# Patient Record
Sex: FEMALE | Race: WHITE | NOT HISPANIC OR LATINO | Employment: STUDENT | URBAN - METROPOLITAN AREA
[De-identification: names, ages, dates, MRNs, and addresses within clinical notes are randomized per-mention and may not be internally consistent; named-entity substitution may affect disease eponyms.]

---
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
## 2018-07-21 ENCOUNTER — HEALTH MAINTENANCE LETTER (OUTPATIENT)
Age: 18
End: 2018-07-21

## 2018-12-18 ENCOUNTER — OFFICE VISIT (OUTPATIENT)
Dept: FAMILY MEDICINE | Facility: CLINIC | Age: 18
End: 2018-12-18

## 2018-12-18 VITALS
BODY MASS INDEX: 29.23 KG/M2 | TEMPERATURE: 98.9 F | SYSTOLIC BLOOD PRESSURE: 112 MMHG | DIASTOLIC BLOOD PRESSURE: 68 MMHG | OXYGEN SATURATION: 98 % | WEIGHT: 171.2 LBS | HEIGHT: 64 IN | HEART RATE: 79 BPM

## 2018-12-18 DIAGNOSIS — Z00.00 ROUTINE HISTORY AND PHYSICAL EXAMINATION OF ADULT: Primary | ICD-10-CM

## 2018-12-18 DIAGNOSIS — E66.3 OVERWEIGHT: ICD-10-CM

## 2018-12-18 DIAGNOSIS — E55.9 VITAMIN D DEFICIENCY: ICD-10-CM

## 2018-12-18 PROBLEM — Z71.89 ACP (ADVANCE CARE PLANNING): Status: ACTIVE | Noted: 2018-12-18

## 2018-12-18 PROBLEM — Z76.89 HEALTH CARE HOME: Status: ACTIVE | Noted: 2018-12-18

## 2018-12-18 LAB
ERYTHROCYTE [DISTWIDTH] IN BLOOD BY AUTOMATED COUNT: 12.9 %
HBA1C MFR BLD: 5.3 % (ref 0–5.6)
HCT VFR BLD AUTO: 41.5 % (ref 35–47)
HEMOGLOBIN: 13.6 G/DL (ref 11.7–15.7)
MCH RBC QN AUTO: 27.4 PG (ref 26–33)
MCHC RBC AUTO-ENTMCNC: 32.8 G/DL (ref 31–36)
MCV RBC AUTO: 83.6 FL (ref 78–100)
PLATELET COUNT - QUEST: 277 10^9/L (ref 150–375)
RBC # BLD AUTO: 4.96 10*12/L (ref 3.8–5.2)
WBC # BLD AUTO: 7.2 10*9/L (ref 4–11)

## 2018-12-18 PROCEDURE — 83036 HEMOGLOBIN GLYCOSYLATED A1C: CPT | Performed by: PHYSICIAN ASSISTANT

## 2018-12-18 PROCEDURE — 82306 VITAMIN D 25 HYDROXY: CPT | Mod: 90 | Performed by: PHYSICIAN ASSISTANT

## 2018-12-18 PROCEDURE — 84443 ASSAY THYROID STIM HORMONE: CPT | Mod: 90 | Performed by: PHYSICIAN ASSISTANT

## 2018-12-18 PROCEDURE — 99385 PREV VISIT NEW AGE 18-39: CPT | Performed by: PHYSICIAN ASSISTANT

## 2018-12-18 PROCEDURE — 36415 COLL VENOUS BLD VENIPUNCTURE: CPT | Performed by: PHYSICIAN ASSISTANT

## 2018-12-18 PROCEDURE — 85027 COMPLETE CBC AUTOMATED: CPT | Performed by: PHYSICIAN ASSISTANT

## 2018-12-18 RX ORDER — CHLORAL HYDRATE 500 MG
2 CAPSULE ORAL DAILY
COMMUNITY
End: 2020-01-20

## 2018-12-18 RX ORDER — MULTIPLE VITAMINS W/ MINERALS TAB 9MG-400MCG
1 TAB ORAL DAILY
COMMUNITY
End: 2021-05-20

## 2018-12-18 SDOH — HEALTH STABILITY: MENTAL HEALTH: HOW OFTEN DO YOU HAVE A DRINK CONTAINING ALCOHOL?: NEVER

## 2018-12-18 ASSESSMENT — ANXIETY QUESTIONNAIRES
IF YOU CHECKED OFF ANY PROBLEMS ON THIS QUESTIONNAIRE, HOW DIFFICULT HAVE THESE PROBLEMS MADE IT FOR YOU TO DO YOUR WORK, TAKE CARE OF THINGS AT HOME, OR GET ALONG WITH OTHER PEOPLE: NOT DIFFICULT AT ALL
3. WORRYING TOO MUCH ABOUT DIFFERENT THINGS: NOT AT ALL
1. FEELING NERVOUS, ANXIOUS, OR ON EDGE: NOT AT ALL
5. BEING SO RESTLESS THAT IT IS HARD TO SIT STILL: NOT AT ALL
GAD7 TOTAL SCORE: 0
2. NOT BEING ABLE TO STOP OR CONTROL WORRYING: NOT AT ALL
7. FEELING AFRAID AS IF SOMETHING AWFUL MIGHT HAPPEN: NOT AT ALL
6. BECOMING EASILY ANNOYED OR IRRITABLE: NOT AT ALL

## 2018-12-18 ASSESSMENT — PATIENT HEALTH QUESTIONNAIRE - PHQ9
5. POOR APPETITE OR OVEREATING: NOT AT ALL
SUM OF ALL RESPONSES TO PHQ QUESTIONS 1-9: 0

## 2018-12-18 ASSESSMENT — MIFFLIN-ST. JEOR: SCORE: 1531.56

## 2018-12-18 NOTE — NURSING NOTE
Patient is here for a full physical exam.    Pre-Visit Screening :  Immunizations : up to date  Colon Screening : NA  Mammogram: NA  Asthma Action Test/Plan : NA  PHQ9 :  Done today  GAD7 :  Done today  Patient's  BMI Body mass index is 29.39 kg/m .  Questioned patient about current smoking habits.  Pt. has never smoked.  OK to leave a detailed voice message regarding today's visit Yes, phone # [phone]

## 2018-12-18 NOTE — PROGRESS NOTES
SUBJECTIVE:   CC: Anaya Serna is an 18 year old woman who presents for preventive health visit.     Healthy Habits:    Do you get at least three servings of calcium containing foods daily (dairy, green leafy vegetables, etc.)? yes    Amount of exercise or daily activities, outside of work: yoga 5x a week     Problems taking medications regularly No    Medication side effects: No    Have you had an eye exam in the past two years? yes    Do you see a dentist twice per year? yes    Do you have sleep apnea, excessive snoring or daytime drowsiness?no    Pt is new to our clinic.    I have reviewed the following histories: Past Medical History, Past Surgical History, Social History, Family History, Problem List, Medication List and Allergies      Pt at [facility]    Hx of Vit D def. High school  Not currently taking Vit D    AUDIE:   HodgemanCalderon Bernabe MD  Dg with AUDIE at 11-12.   Seeing therapist once a month    Has never been sexually active, no pap hx.       Today's PHQ-2 Score:   PHQ-2 ( 1999 Pfizer) 12/18/2018   Q1: Little interest or pleasure in doing things 0   Q2: Feeling down, depressed or hopeless 0   PHQ-2 Score 0       Abuse: Current or Past(Physical, Sexual or Emotional)- No  Do you feel safe in your environment? Yes    Social History     Tobacco Use     Smoking status: Never Smoker     Smokeless tobacco: Never Used   Substance Use Topics     Alcohol use: No     Frequency: Never     If you drink alcohol do you typically have >3 drinks per day or >7 drinks per week? No                     Reviewed orders with patient.  Reviewed health maintenance and updated orders accordingly - Yes  Labs reviewed in EPIC  BP Readings from Last 3 Encounters:   12/18/18 112/68   04/17/17 112/83 (52 %/ 97 %)*   10/19/15 125/78     *BP percentiles are based on the August 2017 AAP Clinical Practice Guideline for girls    Wt Readings from Last 3 Encounters:   12/18/18 77.7 kg (171 lb 3.2 oz)   04/16/17 61.2 kg  (135 lb) (64 %)*     * Growth percentiles are based on CDC (Girls, 2-20 Years) data.                  Patient Active Problem List   Diagnosis     Pain in joint, ankle and foot     Hip pain     Abnormal gait     Status post hip surgery     Hip pain, right     Health Care Home     ACP (advance care planning)     Past Surgical History:   Procedure Laterality Date     HIP SURGERY Left 2016    MD in [place]     other surgery      hardware removal of hip 2016       Social History     Tobacco Use     Smoking status: Never Smoker     Smokeless tobacco: Never Used   Substance Use Topics     Alcohol use: No     Frequency: Never     Family History   Problem Relation Age of Onset     Anxiety Disorder Mother      Hypertension Father      Lupus Maternal Grandmother      Chronic Bronchitis Maternal Grandmother         non-smoker     Obstructive Sleep Apnea Maternal Grandmother      Hypertension Maternal Grandfather      Alcoholism Maternal Grandfather      Obstructive Sleep Apnea Maternal Grandfather      Anxiety Disorder Paternal Grandmother      Other - See Comments Paternal Grandmother         insomnia     Prostate Cancer Paternal Grandfather      Melanoma Paternal Grandfather          Current Outpatient Medications   Medication Sig Dispense Refill     fish oil-omega-3 fatty acids 1000 MG capsule Take 2 g by mouth daily       FLUoxetine (PROZAC) 20 MG capsule Take 3 capsules (60 mg) by mouth daily 270 capsule 3     LORAZEPAM PO Take 0.5 mg by mouth every 8 hours as needed for anxiety       multivitamin w/minerals (MULTI-VITAMIN) tablet Take 1 tablet by mouth daily       Probiotic Product (PROBIOTIC-10 PO)        Allergies   Allergen Reactions     Azithromycin Hives     Benadryl [Diphenhydramine]      Anxious, shaky     Latex Hives     No lab results found.     Mammogram not appropriate for this patient based on age.    Pertinent mammograms are reviewed under the imaging tab.  History of abnormal Pap smear: NO - under age 21,  "PAP not appropriate for age     Reviewed and updated as needed this visit by clinical staff  Tobacco  Allergies  Meds  Problems  Soc Hx        Reviewed and updated as needed this visit by Provider  Allergies        Past Medical History:   Diagnosis Date     Hip dysplasia      Pneumonia 2015, 2017    hospitalized 2015      Past Surgical History:   Procedure Laterality Date     HIP SURGERY Left 2016    MD in [place]     other surgery      hardware removal of hip 2016       ROS:  CONSTITUTIONAL: NEGATIVE for fever, chills, change in weight  INTEGUMENTARU/SKIN: NEGATIVE for worrisome rashes, moles or lesions  EYES: NEGATIVE for vision changes or irritation  ENT: NEGATIVE for ear, mouth and throat problems  RESP: NEGATIVE for significant cough or SOB  BREAST: NEGATIVE for masses, tenderness or discharge  CV: NEGATIVE for chest pain, palpitations or peripheral edema  GI: NEGATIVE for nausea, abdominal pain, heartburn, or change in bowel habits  : NEGATIVE for unusual urinary or vaginal symptoms. Periods are regular. Bad cramps first day  MUSCULOSKELETAL: NEGATIVE for significant arthralgias or myalgia  NEURO: NEGATIVE for weakness, dizziness or paresthesias  PSYCHIATRIC: NEGATIVE for changes in mood or affect    OBJECTIVE:   /68 (BP Location: Left arm, Patient Position: Sitting, Cuff Size: Adult Large)   Pulse 79   Temp 98.9  F (37.2  C) (Oral)   Ht 1.626 m (5' 4\")   Wt 77.7 kg (171 lb 3.2 oz)   LMP 12/18/2018   SpO2 98%   BMI 29.39 kg/m       EXAM:  GENERAL: healthy, alert and no distress  EYES: Eyes grossly normal to inspection, PERRL and conjunctivae and sclerae normal  HENT: ear canals and TM's normal, nose and mouth without ulcers or lesions  NECK: no adenopathy, no asymmetry, masses, or scars and thyroid normal to palpation  RESP: lungs clear to auscultation - no rales, rhonchi or wheezes  CV: regular rate and rhythm, normal S1 S2, no S3 or S4, no murmur, click or rub  ABDOMEN: soft, nontender, " "no hepatosplenomegaly, no masses and bowel sounds normal  MS: no gross musculoskeletal defects noted, no edema  SKIN: no suspicious lesions or rashes  NEURO: Normal strength and tone, mentation intact and speech normal  PSYCH: mentation appears normal, affect normal/bright      ASSESSMENT/PLAN:   (Z00.00) Routine history and physical examination of adult  (primary encounter diagnosis)  Comment: new pt physical  Plan: HEMOGRAM/PLATELET (B[facility]), TSH with free T4         reflex ([facility]), VENOUS COLLECTION, Hemoglobin         A1c (BFP)              (E66.3) Overweight  Comment: stable  Plan:   As below    (E55.9) Vitamin D deficiency  Comment: recheck today  Plan: Vitamin D deficiency screening ([facility]), VENOUS         COLLECTION              COUNSELING:   Special attention given to:        Regular exercise       Healthy diet/nutrition       Contraception    BP Readings from Last 1 Encounters:   12/18/18 112/68     Estimated body mass index is 29.39 kg/m  as calculated from the following:    Height as of this encounter: 1.626 m (5' 4\").    Weight as of this encounter: 77.7 kg (171 lb 3.2 oz).      Weight management plan: Discussed healthy diet and exercise guidelines     reports that  has never smoked. she has never used smokeless tobacco.      Counseling Resources:  ATP IV Guidelines  Pooled Cohorts Equation Calculator  Breast Cancer Risk Calculator  FRAX Risk Assessment  ICSI Preventive Guidelines  Dietary Guidelines for Americans, 2010  USDA's MyPlate  ASA Prophylaxis  Lung CA Screening    RANDY Wade  [facility]  "

## 2018-12-19 LAB
TSH SERPL-ACNC: 1.13 MIU/L
VITAMIN D, 25-OH, TOTAL - QUEST: 27 NG/ML (ref 30–100)

## 2018-12-19 ASSESSMENT — ANXIETY QUESTIONNAIRES: GAD7 TOTAL SCORE: 0

## 2019-04-19 ENCOUNTER — HEALTH MAINTENANCE LETTER (OUTPATIENT)
Age: 19
End: 2019-04-19

## 2019-10-02 ENCOUNTER — HEALTH MAINTENANCE LETTER (OUTPATIENT)
Age: 19
End: 2019-10-02

## 2019-10-25 ENCOUNTER — HOSPITAL ENCOUNTER (EMERGENCY)
Facility: CLINIC | Age: 19
Discharge: HOME | End: 2019-10-25
Attending: EMERGENCY MEDICINE | Admitting: EMERGENCY MEDICINE
Payer: COMMERCIAL

## 2019-10-25 ENCOUNTER — APPOINTMENT (OUTPATIENT)
Dept: MRI IMAGING | Facility: CLINIC | Age: 19
End: 2019-10-25
Attending: EMERGENCY MEDICINE
Payer: COMMERCIAL

## 2019-10-25 VITALS
HEIGHT: 64 IN | SYSTOLIC BLOOD PRESSURE: 132 MMHG | WEIGHT: 155 LBS | OXYGEN SATURATION: 97 % | BODY MASS INDEX: 26.46 KG/M2 | RESPIRATION RATE: 16 BRPM | TEMPERATURE: 98.2 F | HEART RATE: 90 BPM | DIASTOLIC BLOOD PRESSURE: 84 MMHG

## 2019-10-25 DIAGNOSIS — M25.552 HIP PAIN, LEFT: ICD-10-CM

## 2019-10-25 LAB
BASOPHILS # BLD AUTO: 0 10E9/L (ref 0–0.2)
BASOPHILS NFR BLD AUTO: 0.3 %
CRP SERPL-MCNC: <2.9 MG/L (ref 0–8)
DIFFERENTIAL METHOD BLD: NORMAL
EOSINOPHIL # BLD AUTO: 0.1 10E9/L (ref 0–0.7)
EOSINOPHIL NFR BLD AUTO: 1.1 %
ERYTHROCYTE [DISTWIDTH] IN BLOOD BY AUTOMATED COUNT: 13 % (ref 10–15)
ERYTHROCYTE [SEDIMENTATION RATE] IN BLOOD BY WESTERGREN METHOD: 8 MM/H (ref 0–20)
HCT VFR BLD AUTO: 41.1 % (ref 35–47)
HGB BLD-MCNC: 13.5 G/DL (ref 11.7–15.7)
IMM GRANULOCYTES # BLD: 0 10E9/L (ref 0–0.4)
IMM GRANULOCYTES NFR BLD: 0.3 %
LYMPHOCYTES # BLD AUTO: 1.6 10E9/L (ref 0.8–5.3)
LYMPHOCYTES NFR BLD AUTO: 20.8 %
MCH RBC QN AUTO: 28.8 PG (ref 26.5–33)
MCHC RBC AUTO-ENTMCNC: 32.8 G/DL (ref 31.5–36.5)
MCV RBC AUTO: 88 FL (ref 78–100)
MONOCYTES # BLD AUTO: 0.6 10E9/L (ref 0–1.3)
MONOCYTES NFR BLD AUTO: 8.4 %
NEUTROPHILS # BLD AUTO: 5.3 10E9/L (ref 1.6–8.3)
NEUTROPHILS NFR BLD AUTO: 69.1 %
NRBC # BLD AUTO: 0 10*3/UL
NRBC BLD AUTO-RTO: 0 /100
PLATELET # BLD AUTO: 275 10E9/L (ref 150–450)
RBC # BLD AUTO: 4.69 10E12/L (ref 3.8–5.2)
WBC # BLD AUTO: 7.6 10E9/L (ref 4–11)

## 2019-10-25 PROCEDURE — 96374 THER/PROPH/DIAG INJ IV PUSH: CPT

## 2019-10-25 PROCEDURE — 85652 RBC SED RATE AUTOMATED: CPT | Performed by: EMERGENCY MEDICINE

## 2019-10-25 PROCEDURE — 72195 MRI PELVIS W/O DYE: CPT

## 2019-10-25 PROCEDURE — 25000128 H RX IP 250 OP 636: Performed by: EMERGENCY MEDICINE

## 2019-10-25 PROCEDURE — 25000132 ZZH RX MED GY IP 250 OP 250 PS 637: Performed by: EMERGENCY MEDICINE

## 2019-10-25 PROCEDURE — 99285 EMERGENCY DEPT VISIT HI MDM: CPT | Mod: 25

## 2019-10-25 PROCEDURE — 86140 C-REACTIVE PROTEIN: CPT | Performed by: EMERGENCY MEDICINE

## 2019-10-25 PROCEDURE — 96375 TX/PRO/DX INJ NEW DRUG ADDON: CPT

## 2019-10-25 PROCEDURE — 85025 COMPLETE CBC W/AUTO DIFF WBC: CPT | Performed by: EMERGENCY MEDICINE

## 2019-10-25 RX ORDER — ONDANSETRON 4 MG/1
4 TABLET, ORALLY DISINTEGRATING ORAL EVERY 8 HOURS PRN
Qty: 10 TABLET | Refills: 0 | Status: SHIPPED | OUTPATIENT
Start: 2019-10-25 | End: 2019-11-26

## 2019-10-25 RX ORDER — METHOCARBAMOL 500 MG/1
500 TABLET, FILM COATED ORAL 4 TIMES DAILY PRN
Qty: 20 TABLET | Refills: 0 | Status: SHIPPED | OUTPATIENT
Start: 2019-10-25 | End: 2019-11-26

## 2019-10-25 RX ORDER — POLYETHYLENE GLYCOL 3350 17 G/17G
1 POWDER, FOR SOLUTION ORAL DAILY
Qty: 527 G | Refills: 0 | Status: SHIPPED | OUTPATIENT
Start: 2019-10-25 | End: 2019-11-26

## 2019-10-25 RX ORDER — OXYCODONE HYDROCHLORIDE 5 MG/1
2.5 TABLET ORAL EVERY 6 HOURS PRN
Qty: 12 TABLET | Refills: 0 | Status: SHIPPED | OUTPATIENT
Start: 2019-10-25 | End: 2019-11-26

## 2019-10-25 RX ORDER — AMOXICILLIN 250 MG
1 CAPSULE ORAL DAILY
Qty: 20 TABLET | Refills: 0 | Status: SHIPPED | OUTPATIENT
Start: 2019-10-25 | End: 2019-11-26

## 2019-10-25 RX ORDER — KETOROLAC TROMETHAMINE 15 MG/ML
15 INJECTION, SOLUTION INTRAMUSCULAR; INTRAVENOUS ONCE
Status: COMPLETED | OUTPATIENT
Start: 2019-10-25 | End: 2019-10-25

## 2019-10-25 RX ORDER — ONDANSETRON 2 MG/ML
4 INJECTION INTRAMUSCULAR; INTRAVENOUS ONCE
Status: COMPLETED | OUTPATIENT
Start: 2019-10-25 | End: 2019-10-25

## 2019-10-25 RX ADMIN — OXYCODONE HYDROCHLORIDE 2.5 MG: 5 TABLET ORAL at 18:33

## 2019-10-25 RX ADMIN — ONDANSETRON 4 MG: 2 INJECTION INTRAMUSCULAR; INTRAVENOUS at 16:05

## 2019-10-25 RX ADMIN — OXYCODONE HYDROCHLORIDE 2.5 MG: 5 TABLET ORAL at 16:09

## 2019-10-25 RX ADMIN — KETOROLAC TROMETHAMINE 15 MG: 15 INJECTION, SOLUTION INTRAMUSCULAR; INTRAVENOUS at 16:04

## 2019-10-25 ASSESSMENT — ENCOUNTER SYMPTOMS
ARTHRALGIAS: 1
HEADACHES: 1
LIGHT-HEADEDNESS: 1
VOMITING: 0
NUMBNESS: 1
NAUSEA: 1
BACK PAIN: 1

## 2019-10-25 ASSESSMENT — MIFFLIN-ST. JEOR: SCORE: 1453.08

## 2019-10-25 NOTE — ED PROVIDER NOTES
History     Chief Complaint:    Hip Pain    The history is provided by the patient and a parent.      Anaya Serna is a 19 year old female with a history of hip dysplasia and status-post periacetabular osteotomy in 2016 who presents with left hip pain. The patient reports her left hip and left lower extremity hurting more than usual over the past few days. The patient was driving home from work last night when she noticed she couldn't move her left leg. She went to [facility] and had blood work, and EKG, an x-ray, and several CTA's done suspicious of a fracture, but none were found. She also started experiencing lightheadedness and left lower extremity numbness while there, which is a new symptom for her. She was prescribed Tylenol and Codeine which she has tolerated well in the past. The patient felt fine this morning, but when she tried to walk this afternoon, she experienced a significant amount of left lower extremity and hip pain. The pain is deep in her left hip and radiates into the groin area and lower back. She also felt lightheaded and had a syncopal episode, resulting in a fall and hitting her head on a coffee table. Currently she reports nausea and a headache. She denies any vomiting or warmth. She also denies any memory loss or trauma. She has a history of periodical hip pain after her surgeries, but nothing has ever been this severe. The patient last had Tylenol 2 hours prior to arrival here. In the past, Toradol has not helped and she has not tolerated Norco or Dilaudid well. She had an MRI done in March at [facility] and a CT done at the end of September with Dr. Aggarwal, but she was unsure which organization that was processed through. Of note: the patient had a second surgery to remove the hardware from the previous one in 2016. She also has a third surgery scheduled for February once she graduates from college in December.     CT Left Hip with 2D & 3D Hip Reconstruction  "9/17/19:  1. Post surg changes left acetabulum  2. Type 2 anterior inferior iliac spine  3. 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 No bony deformities or injuries.    Allergies:  Azithromycin  Benadryl [Diphenhydramine]  Latex     Medications:    Prozac  Acetaminophen-codeine  Ativan  Spirolactone    Past Medical History:    Hip dysplasia  Pneumonia    Past Surgical History:    Hip surgery  Hardware removal of hip in 2016    Family History:    Anxiety - mother  Hypertension - father    Social History:  Negative for tobacco use.  Negative for alcohol use.  Negative for drug use.  Patient accompanied to the  ER by her father.  Marital Status:  Single [1]     Review of Systems   Gastrointestinal: Positive for nausea. Negative for vomiting.   Musculoskeletal: Positive for arthralgias (left hip) and back pain (low back).        Left lower extremity pain   Skin:        Denies warmth   Neurological: Positive for syncope, light-headedness, numbness (left lower extremity) and headaches.        Denies memory loss   All other systems reviewed and are negative.        Physical Exam     Patient Vitals for the past 24 hrs:   BP Temp Temp src Pulse Resp SpO2 Height Weight   10/25/19 1630 132/84 -- -- 90 -- -- -- --   10/25/19 1454 113/82 98.2  F (36.8  C) Temporal 88 16 97 % 1.626 m (5' 4\") 70.3 kg (155 lb)     Physical Exam    GENERAL: well developed, pleasant  HEAD: atraumatic  EYES: pupils reactive, extraocular muscles intact, conjunctivae normal  ENT:  mucus membranes moist  NECK:  trachea midline, normal range of motion  RESPIRATORY: no tachypnea, breath sounds clear to auscultation   CVS: normal S1/S2, no murmurs, intact distal pulses  ABDOMEN: soft, nontender, nondistention  MUSCULOSKELETAL: no deformities. Pain to the left groin. Limited rang of motion with slight rotation of hip causing pain.  SKIN: warm and dry, no acute rashes or ulceration  NEURO: GCS 15, cranial nerves intact, alert and oriented " x3  PSYCH:  Mood/affect normal    Emergency Department Course     Imaging:  Radiology findings were communicated with the patient and family who voiced understanding of the findings.    MR Pelvis Bone w/o contrast:  1. Postoperative changes as described above. No acute appearing bony or soft tissue abnormality.  2. Minimal free fluid in the cul-de-sac, as per radiology.     Laboratory:  Laboratory findings were communicated with the patient and family who voiced understanding of the findings.    CBC: AWNL. (WBC 7.6, HGB 13.5, )   CRP inflammation: <2.9  Erythrocyte sedimentation rate auto: 8    Interventions:  1604: Toradol 15 mg IV  1605: Zofran 4 mg IV  1609: Roxicodone 2.5 mg PO  1833: Roxicodone 2.5 mg PO    Emergency Department Course:  Past medical records, nursing notes, and vitals reviewed.    1509: I performed an exam of the patient as documented above.     IV was inserted and blood was drawn for laboratory testing, results above.    The patient was sent for a pelvis bone MR while in the emergency department, results above.     1547: I spoke with orthopedics regarding the patient.    1833: Patient rechecked and updated.      I personally reviewed the laboratory and imaging results with the Patient and father and answered all related questions prior to discharge.     Findings and plan explained to the Patient and father. Patient discharged home with instructions regarding supportive care, medications, and reasons to return. The importance of close follow-up was reviewed.     Impression & Plan     Medical Decision Making:    Anaya Serna is a 19 year old female who presents to the emergency department today with left hip pain.  Patient had a complex past surgical history with this hip.  She had periacetabular osteotomy 2016 and traveled from [place] to [place] specifically for the surgery.  She is also been noted to have a labral tear with plans for surgery this coming January or February  after she is done with class.  She has intermittent clicking and pain but is been managed with Tylenol or Motrin.  She notes recent increased pain and was seen at [facility] yesterday.  She had CT of her hip, and lumbar spine without any acute findings.  Patient was given Tylenol with codeine as she notes that she was sensitive to narcotics in the past.  She notes a syncopal episode date today setting of severe pain while trying to bear weight.  She did have head injury although she has no loss of consciousness, amnesia, vomiting or signs of trauma to her head.  She is not anticoagulated.  Do not feel that she needs head imaging.  Labs were just obtained yesterday.  I see a slightly elevated white blood cell count labs are rechecked today and are normal with a normal sed rate and CRP.  Did not suspect septic joint.  MRI was obtained owing findings as above.  I did speak with orthopedics team and notes that her anterior iliac spine is somewhat prominent after her surgery and could potentially cause irritation to her or so as muscle just given the anatomical variation of her prior osteotomy.  Certainly appears to be hip related as opposed to sciatica.  Patient is given pain control and ambulated and tolerated this reasonably well.  Discussed outpatient management and pain control with her and she does have an upcoming appointment to see Dr. Aggarwal this coming Wednesday.  Potentially has more of a so as muscle irritation from her prior surgery but overall did not see an acute fracture, signs of infection or needs for emergent surgery.  Patient feels comfortable going home.      Discharge Diagnosis:    ICD-10-CM   1. Hip pain, left M25.552     Disposition:  Discharged to home.    Discharge Medications:  New Prescriptions    METHOCARBAMOL (ROBAXIN) 500 MG TABLET    Take 1 tablet (500 mg) by mouth 4 times daily as needed for muscle spasms    ONDANSETRON (ZOFRAN ODT) 4 MG ODT TAB    Take 1 tablet (4 mg) by mouth every 8 hours  as needed for nausea    OXYCODONE (ROXICODONE) 5 MG TABLET    Take 0.5 tablets (2.5 mg) by mouth every 6 hours as needed for pain Take 1/2 - 1 tablet every 4-6 hours as needed for pain    POLYETHYLENE GLYCOL (MIRALAX) POWDER    Take 17 g (1 capful) by mouth daily    SENNA-DOCUSATE (SENOKOT-S/PERICOLACE) 8.6-50 MG TABLET    Take 1 tablet by mouth daily     Scribe Disclosure:  I, Anamaria Santamaria, am serving as a scribe at 3:15 PM on 10/25/2019 to document services personally performed by Darren Padgett MD based on my observations and the provider's statements to me.     Anamaria Santamaria  10/25/2019    EMERGENCY DEPARTMENT       Darren Padgett MD  10/30/19 0024

## 2019-10-25 NOTE — ED AVS SNAPSHOT
Emergency Department  [address] [place] [address]  Phone:  [phone]  Fax:  [phone]                                    Anaya Serna   MRN: [id number]    Department:   Emergency Department   Date of Visit:  10/25/2019           After Visit Summary Signature Page    I have received my discharge instructions, and my questions have been answered. I have discussed any challenges I see with this plan with the nurse or doctor.    ..........................................................................................................................................  Patient/Patient Representative Signature      ..........................................................................................................................................  Patient Representative Print Name and Relationship to Patient    ..................................................               ................................................  Date                                   Time    ..........................................................................................................................................  Reviewed by Signature/Title    ...................................................              ..............................................  Date                                               Time          22EPIC Rev 08/18

## 2019-10-25 NOTE — ED TRIAGE NOTES
Pt felt dizzy this am, was standing and either tripped on a rug or had a possible LOC and fell and hit her head on the coffee table, from the fall there was no LOC.  [facility], at that time her BPS were low and she got 1L ivfs and sent home with tylenol 3.

## 2019-10-25 NOTE — ED NOTES
----- Message from Jeremiah Contreras MD sent at 3/5/2018  4:34 PM CST -----  Results reviewed     Bed: ED11  Expected date:   Expected time:   Means of arrival:   Comments:  Gill Medina4 Fall hit head 19 f

## 2019-11-26 ENCOUNTER — OFFICE VISIT (OUTPATIENT)
Dept: FAMILY MEDICINE | Facility: CLINIC | Age: 19
End: 2019-11-26

## 2019-11-26 VITALS
OXYGEN SATURATION: 98 % | TEMPERATURE: 98 F | DIASTOLIC BLOOD PRESSURE: 70 MMHG | SYSTOLIC BLOOD PRESSURE: 112 MMHG | HEART RATE: 81 BPM | WEIGHT: 157 LBS | BODY MASS INDEX: 26.95 KG/M2

## 2019-11-26 DIAGNOSIS — H10.32 ACUTE BACTERIAL CONJUNCTIVITIS OF LEFT EYE: Primary | ICD-10-CM

## 2019-11-26 PROCEDURE — 99213 OFFICE O/P EST LOW 20 MIN: CPT | Performed by: PHYSICIAN ASSISTANT

## 2019-11-26 RX ORDER — POLYMYXIN B SULFATE AND TRIMETHOPRIM 1; 10000 MG/ML; [USP'U]/ML
1-2 SOLUTION OPHTHALMIC 4 TIMES DAILY
Qty: 10 ML | Refills: 0 | Status: SHIPPED | OUTPATIENT
Start: 2019-11-26 | End: 2020-01-20

## 2019-11-26 RX ORDER — SPIRONOLACTONE 50 MG/1
50 TABLET, FILM COATED ORAL DAILY
COMMUNITY
End: 2021-05-20

## 2019-11-26 NOTE — PROGRESS NOTES
CC: Eye infection    History:  Anaya is here today with 3 days of left eye being crusted over, and eye feels irritated in general. Since that time has been painful and continued to drain. No impact to left eye. No history of eye problems. Has not been using any eye drops. Symptoms do feel somewhat worse on outside of eye. No foreign body sensation. No light sensitivity or vision change.     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     Anaya also explains that she has a latex and Benadryl allergies. Today had rash on left cheek, and took 1/2 dose of prednisone, which she has from her allergies due to her Benadryl intolerance. This has been helping, but she is wondering if this could be related.     PMH, MEDICATIONS, ALLERGIES, SOCIAL AND FAMILY HISTORY in EPIC and reviewed by me personally.    ROS negative other than the symptoms noted above in the HPI.      Examination   /70 (BP Location: Right arm, Patient Position: Sitting, Cuff Size: Adult Large)   Pulse 81   Temp 98  F (36.7  C) (Oral)   Wt 71.2 kg (157 lb)   LMP 11/14/2019   SpO2 98%   BMI 26.95 kg/m         Constitutional: Sitting comfortably, in no acute distress. Vital signs noted  Eyes: pupils equal round reactive to light and accomodation, extra ocular movements intact  Mouth and throat: without erythema or lesions of the mucosa  Neck:  no adenopathy, trachea midline and normal to palpation, thyroid normal to palpation  Cardiovascular:  regular rate and rhythm, no murmurs, clicks, or gallops  Respiratory:  normal respiratory rate and rhythm, lungs clear to auscultation  SKIN: No jaundice/pallor/rash.   Psychiatric: mentation appears normal and affect normal/bright    A/P    ICD-10-CM    1. Acute bacterial conjunctivitis of left eye H10.32 trimethoprim-polymyxin b (POLYTRIM) 42727-9.1 UNIT/ML-% ophthalmic solution       DISCUSSION:  Symptoms consistent with conjunctivitis. Explained allergic vs bacterial vs  viral. Given yellow discharge, will treat for bacterial, but explained it could still be viral.     Recommended antibiotic (Polytrim) drop in both eyes 4 times daily for 5-7 days.   Rewetting drops (ex. Blink, Systane, Thera tears) in between antibiotic drops.  Eye hygiene 2-3 times daily, using warm paper towel to wipe eye outwards.   Do not use same cloth on both eyes. Use new cloth every time.   Frequent hand washing    Contact me in 1 week if not significantly better, or sooner with worsening.     follow up visit: As needed    Ramona Poe PA-C  [facility]

## 2019-11-26 NOTE — PATIENT INSTRUCTIONS
1 antibiotic (Polytrim) drop in both eyes every 4 hours for 5-7 days.   Rewetting drops (ex. Blink, Systane, Thera tears) in between antibiotic drops.  Eye hygiene 2-3 times daily, using warm paper towel to wipe eye outwards.   Do not use same cloth on both eyes. Use new cloth every time.   Frequent hand washing

## 2019-11-26 NOTE — NURSING NOTE
Anaya is here for left eye has had drainage and painful for 3 days.          Pre-visit Screening:  Immunizations:  up to date  Colonoscopy:  NA  Mammogram: NA  Asthma Action Test/Plan:  NA  PHQ9:  NA to today's visit  GAD7:  NA to today's visit  Questioned patient about current smoking habits Pt. has never smoked.  Ok to leave detailed message on voice mail for today's visit only Yes, phone # [phone]

## 2020-01-20 ENCOUNTER — OFFICE VISIT (OUTPATIENT)
Dept: FAMILY MEDICINE | Facility: CLINIC | Age: 20
End: 2020-01-20

## 2020-01-20 VITALS
HEIGHT: 64 IN | TEMPERATURE: 98.5 F | OXYGEN SATURATION: 98 % | HEART RATE: 82 BPM | BODY MASS INDEX: 26.63 KG/M2 | SYSTOLIC BLOOD PRESSURE: 102 MMHG | WEIGHT: 156 LBS | DIASTOLIC BLOOD PRESSURE: 66 MMHG

## 2020-01-20 DIAGNOSIS — Z01.818 PRE-OPERATIVE EXAMINATION: Primary | ICD-10-CM

## 2020-01-20 DIAGNOSIS — Q65.89 CONGENITAL HIP DYSPLASIA: ICD-10-CM

## 2020-01-20 DIAGNOSIS — F41.1 GENERALIZED ANXIETY DISORDER: ICD-10-CM

## 2020-01-20 LAB
ERYTHROCYTE [DISTWIDTH] IN BLOOD BY AUTOMATED COUNT: 13 %
HCT VFR BLD AUTO: 44.8 % (ref 35–47)
HEMOGLOBIN: 14.5 G/DL (ref 11.7–15.7)
MCH RBC QN AUTO: 28.5 PG (ref 26–33)
MCHC RBC AUTO-ENTMCNC: 32.4 G/DL (ref 31–36)
MCV RBC AUTO: 88.2 FL (ref 78–100)
PLATELET COUNT - QUEST: 266 10^9/L (ref 150–375)
RBC # BLD AUTO: 5.08 10*12/L (ref 3.8–5.2)
WBC # BLD AUTO: 11 10*9/L (ref 4–11)

## 2020-01-20 PROCEDURE — 36415 COLL VENOUS BLD VENIPUNCTURE: CPT | Performed by: PHYSICIAN ASSISTANT

## 2020-01-20 PROCEDURE — 85027 COMPLETE CBC AUTOMATED: CPT | Performed by: PHYSICIAN ASSISTANT

## 2020-01-20 PROCEDURE — 99214 OFFICE O/P EST MOD 30 MIN: CPT | Performed by: PHYSICIAN ASSISTANT

## 2020-01-20 RX ORDER — EPINEPHRINE 0.3 MG/.3ML
0.3 INJECTION SUBCUTANEOUS
COMMUNITY
Start: 2018-02-25 | End: 2021-03-25

## 2020-01-20 RX ORDER — DAPSONE 50 MG/G
GEL TOPICAL
COMMUNITY
Start: 2019-09-27 | End: 2021-03-25

## 2020-01-20 ASSESSMENT — MIFFLIN-ST. JEOR: SCORE: 1453.64

## 2020-01-20 NOTE — PROGRESS NOTES
[facility]  [address]  [address]  [facility] [address]  [phone]  Dept: [phone]    PRE-OP EVALUATION:  Today's date: 2020    Anaya Serna (: 2000) presents for pre-operative evaluation assessment as requested by Dr. Asael Aggarwal.  She requires evaluation and anesthesia risk assessment prior to undergoing surgery/procedure for treatment of left scope hip surgery.    Proposed Surgery/ Procedure: Left scope hip surgery, labrum repair  Date of Surgery/ Procedure: 19  Time of Surgery/ Procedure: 6 am  Hospital/Surgical Facility: [facility]  Fax number for surgical facility:   Primary Physician: Mary Ann Gannon  Type of Anesthesia Anticipated: to be determined    Patient has a Health Care Directive or Living Will:  NO    1. NO - Do you have a history of heart attack, stroke, stent, bypass or surgery on an artery in the head, neck, heart or legs?  2. NO - Do you ever have any pain or discomfort in your chest?  3. NO - Do you have a history of  Heart Failure?  4. NO - Are you troubled by shortness of breath when: walking on the level, up a slight hill or at night?  5. NO - Do you currently have a cold, bronchitis or other respiratory infection?  6. NO - Do you have a cough, shortness of breath or wheezing?  7. NO - Do you sometimes get pains in the calves of your legs when you walk?  8. YES - DO YOU OR ANYONE IN YOUR FAMILY HAVE PREVIOUS HISTORY OF BLOOD CLOTS? MGM had PE. Unsure if provoked or unprovoked.  9. NO - Do you or does anyone in your family have a serious bleeding problem such as prolonged bleeding following surgeries or cuts?  10. NO - Have you ever had problems with anemia or been told to take iron pills?  11. NO - Have you had any abnormal blood loss such as black, tarry or bloody stools, or abnormal vaginal bleeding?  12. YES - HAVE YOU EVER HAD A BLOOD TRANSFUSION? 2016 surrounding surgery  13. YES - HAVE YOU OR ANY  OF YOUR RELATIVES EVER HAD PROBLEMS WITH ANESTHESIA? Pt gets nausea with anesthesia.  14. NO - Do you have sleep apnea, excessive snoring or daytime drowsiness?  15. NO - Do you have any prosthetic heart valves?  16. NO - Do you have prosthetic joints?  17. NO - Is there any chance that you may be pregnant?      HPI:     HPI related to upcoming procedure: Had acetabular surgery of left hip in 2016 due to significant congenital hip dysplasia. Did not address labrum at that time on purpose, so now plan is to repair labrum.     See problem list for active medical problems.  Problems all longstanding and stable, except as noted/documented.  See ROS for pertinent symptoms related to these conditions.      MEDICAL HISTORY:     Patient Active Problem List    Diagnosis Date Noted     Congenital hip dysplasia 01/20/2020     Priority: Medium     Generalized anxiety disorder 01/20/2020     Priority: Medium     Hip pain, right 04/20/2017     Priority: Medium     Abnormal gait 05/03/2016     Priority: Medium     Status post hip surgery 05/03/2016     Priority: Medium     Hip pain 12/31/2015     Priority: Medium     Pain in joint, ankle and foot 01/05/2011     Priority: Medium     Health Care Home 12/18/2018     Priority: Low     ACP (advance care planning) 12/18/2018     Priority: Low      Past Medical History:   Diagnosis Date     Hip dysplasia      Hip dysplasia      Pneumonia 2015, 2017    hospitalized 2015     Past Surgical History:   Procedure Laterality Date     HIP SURGERY Left 2016    MD in [place]     other surgery      hardware removal of hip 2016     Current Outpatient Medications   Medication Sig Dispense Refill     EPINEPHrine (EPIPEN/ADRENACLICK/OR ANY BX GENERIC EQUIV) 0.3 MG/0.3ML injection 2-pack Inject 0.3 mg into the muscle       FLUoxetine (PROZAC) 20 MG capsule Take 3 capsules (60 mg) by mouth daily 270 capsule 3     LORAZEPAM PO Take 0.5 mg by mouth every 8 hours as needed for anxiety       multivitamin  "w/minerals (MULTI-VITAMIN) tablet Take 1 tablet by mouth daily       spironolactone (ALDACTONE) 50 MG tablet Take 50 mg by mouth daily       dapsone 5 % topical gel        OTC products: no recent use of OTC ASA, NSAIDS or Steroids    Allergies   Allergen Reactions     Latex Hives and Swelling     Azithromycin Hives     Benadryl [Diphenhydramine]      Anxious, shaky      Latex Allergy: YES    Social History     Tobacco Use     Smoking status: Never Smoker     Smokeless tobacco: Never Used   Substance Use Topics     Alcohol use: No     Frequency: Never     History   Drug Use No       REVIEW OF SYSTEMS:   Constitutional, neuro, ENT, endocrine, pulmonary, cardiac, gastrointestinal, genitourinary, musculoskeletal, integument and psychiatric systems are negative, except as otherwise noted.    EXAM:   /66 (BP Location: Left arm, Patient Position: Sitting, Cuff Size: Adult Large)   Pulse 82   Temp 98.5  F (36.9  C) (Oral)   Ht 1.619 m (5' 3.75\")   Wt 70.8 kg (156 lb)   SpO2 98%   BMI 26.99 kg/m      GENERAL APPEARANCE: healthy, alert and no distress     EYES: EOMI, PERRL     HENT: ear canals and TM's normal and nose and mouth without ulcers or lesions     NECK: no adenopathy, no asymmetry, masses, or scars and thyroid normal to palpation     RESP: lungs clear to auscultation - no rales, rhonchi or wheezes     CV: regular rates and rhythm, normal S1 S2, no S3 or S4 and no murmur, click or rub     ABDOMEN:  soft, nontender, no HSM or masses and bowel sounds normal     MS: extremities normal- no gross deformities noted, no evidence of inflammation in joints, FROM in all extremities.     SKIN: no suspicious lesions or rashes     NEURO: Normal strength and tone, sensory exam grossly normal, mentation intact and speech normal     PSYCH: mentation appears normal. and affect normal/bright     LYMPHATICS: No cervical adenopathy    DIAGNOSTICS:   EKG: Not indicated due to non-vascular surgery and low risk of event (age " <65 and without cardiac risk factors)  Hemoglobin (indicated for history of anemia or procedure with significant blood loss such as tonsillectomy, major intraperitoneal surgery, vascular surgery, major spine surgery, total joint replacement)    Office Visit on 01/20/2020   Component Date Value Ref Range Status     WBC 01/20/2020 11.0  4.0 - 11 10*9/L Final     RBC Count 01/20/2020 5.08  3.8 - 5.2 10*12/L Final     Hemoglobin 01/20/2020 14.5  11.7 - 15.7 g/dL Final     Hematocrit 01/20/2020 44.8  35.0 - 47.0 % Final     MCV 01/20/2020 88.2  78 - 100 fL Final     MCH 01/20/2020 28.5  26 - 33 pg Final     MCHC 01/20/2020 32.4  31 - 36 g/dL Final     RDW 01/20/2020 13.0  % Final     Platelet Count 01/20/2020 266  150 - 375 10^9/L Final       IMPRESSION:   Reason for surgery/procedure: Right hip dysplasia, torn labrum.  Diagnosis/reason for consult: Pre-operative Examination    The proposed surgical procedure is considered INTERMEDIATE risk.    REVISED CARDIAC RISK INDEX  The patient has the following serious cardiovascular risks for perioperative complications such as (MI, PE, VFib and 3  AV Block):  No serious cardiac risks  INTERPRETATION: 0 risks: Class I (very low risk - 0.4% complication rate)    The patient has the following additional risks for perioperative complications:  No identified additional risks      ICD-10-CM    1. Pre-operative examination Z01.818    2. Congenital hip dysplasia Q65.89    3. Generalized anxiety disorder F41.1        RECOMMENDATIONS:     --Patient is to take all scheduled medications on the day of surgery EXCEPT for modifications listed below.    APPROVAL GIVEN to proceed with proposed procedure, without further diagnostic evaluation    1. Seven days before surgery do not take Aspirin or any over-the-counter pain medications other than Tylenol.  TYLENOL is the safest pain pill to use before surgery because it does not affect your bleeding time. If tylenol is not sufficient for pain  control talk to me or the surgeon and we will decide what is safe to use.    2. Do not eat anything after midnight  (ritchie of the surgery) and nothing the morning of the surgery.    3. Medications: Hold medications on day of surgery until after surgery, and hold spironolactone evening before surgery to avoid hypotension.    4. Follow all instructions given by the surgery team. They usually give out a packet. Read it and please follow it precisely. This helps surgical experience and outcomes.    5. If you have any questions do not hesitate to call me or the surgeon/surgical team.    Ramona Poe PA-C  [facility]         Signed Electronically by: Ramona Poe PA-C    Copy of this evaluation report is provided to requesting physician.    Angie Preop Guidelines    Revised Cardiac Risk Index

## 2020-01-20 NOTE — LETTER
[facility]  [address]  [address]  [facility] [address]  [phone]  Dept: [phone]    PRE-OP EVALUATION:  Today's date: 2020    Anaya Serna (: 2000) presents for pre-operative evaluation assessment as requested by Dr. Asael Aggarwal.  She requires evaluation and anesthesia risk assessment prior to undergoing surgery/procedure for treatment of left scope hip surgery.    Proposed Surgery/ Procedure: Left scope hip surgery, labrum repair  Date of Surgery/ Procedure: 19  Time of Surgery/ Procedure: 6 am  Hospital/Surgical Facility: [facility]  Fax number for surgical facility:   Primary Physician: Mary Ann Gannon  Type of Anesthesia Anticipated: to be determined    Patient has a Health Care Directive or Living Will:  NO    1. NO - Do you have a history of heart attack, stroke, stent, bypass or surgery on an artery in the head, neck, heart or legs?  2. NO - Do you ever have any pain or discomfort in your chest?  3. NO - Do you have a history of  Heart Failure?  4. NO - Are you troubled by shortness of breath when: walking on the level, up a slight hill or at night?  5. NO - Do you currently have a cold, bronchitis or other respiratory infection?  6. NO - Do you have a cough, shortness of breath or wheezing?  7. NO - Do you sometimes get pains in the calves of your legs when you walk?  8. YES - DO YOU OR ANYONE IN YOUR FAMILY HAVE PREVIOUS HISTORY OF BLOOD CLOTS? MGM had PE. Unsure if provoked or unprovoked.  9. NO - Do you or does anyone in your family have a serious bleeding problem such as prolonged bleeding following surgeries or cuts?  10. NO - Have you ever had problems with anemia or been told to take iron pills?  11. NO - Have you had any abnormal blood loss such as black, tarry or bloody stools, or abnormal vaginal bleeding?  12. YES - HAVE YOU EVER HAD A BLOOD TRANSFUSION? 2016 surrounding surgery  13. YES - HAVE YOU OR ANY  OF YOUR RELATIVES EVER HAD PROBLEMS WITH ANESTHESIA? Pt gets nausea with anesthesia.  14. NO - Do you have sleep apnea, excessive snoring or daytime drowsiness?  15. NO - Do you have any prosthetic heart valves?  16. NO - Do you have prosthetic joints?  17. NO - Is there any chance that you may be pregnant?      HPI:     HPI related to upcoming procedure: Had acetabular surgery of left hip in 2016 due to significant congenital hip dysplasia. Did not address labrum at that time on purpose, so now plan is to repair labrum.     See problem list for active medical problems.  Problems all longstanding and stable, except as noted/documented.  See ROS for pertinent symptoms related to these conditions.      MEDICAL HISTORY:     Patient Active Problem List    Diagnosis Date Noted     Congenital hip dysplasia 01/20/2020     Priority: Medium     Generalized anxiety disorder 01/20/2020     Priority: Medium     Hip pain, right 04/20/2017     Priority: Medium     Abnormal gait 05/03/2016     Priority: Medium     Status post hip surgery 05/03/2016     Priority: Medium     Hip pain 12/31/2015     Priority: Medium     Pain in joint, ankle and foot 01/05/2011     Priority: Medium     Health Care Home 12/18/2018     Priority: Low     ACP (advance care planning) 12/18/2018     Priority: Low      Past Medical History:   Diagnosis Date     Hip dysplasia      Hip dysplasia      Pneumonia 2015, 2017    hospitalized 2015     Past Surgical History:   Procedure Laterality Date     HIP SURGERY Left 2016    MD in [place]     other surgery      hardware removal of hip 2016     Current Outpatient Medications   Medication Sig Dispense Refill     EPINEPHrine (EPIPEN/ADRENACLICK/OR ANY BX GENERIC EQUIV) 0.3 MG/0.3ML injection 2-pack Inject 0.3 mg into the muscle       FLUoxetine (PROZAC) 20 MG capsule Take 3 capsules (60 mg) by mouth daily 270 capsule 3     LORAZEPAM PO Take 0.5 mg by mouth every 8 hours as needed for anxiety       multivitamin  "w/minerals (MULTI-VITAMIN) tablet Take 1 tablet by mouth daily       spironolactone (ALDACTONE) 50 MG tablet Take 50 mg by mouth daily       dapsone 5 % topical gel        OTC products: no recent use of OTC ASA, NSAIDS or Steroids    Allergies   Allergen Reactions     Latex Hives and Swelling     Azithromycin Hives     Benadryl [Diphenhydramine]      Anxious, shaky      Latex Allergy: YES    Social History     Tobacco Use     Smoking status: Never Smoker     Smokeless tobacco: Never Used   Substance Use Topics     Alcohol use: No     Frequency: Never     History   Drug Use No       REVIEW OF SYSTEMS:   Constitutional, neuro, ENT, endocrine, pulmonary, cardiac, gastrointestinal, genitourinary, musculoskeletal, integument and psychiatric systems are negative, except as otherwise noted.    EXAM:   /66 (BP Location: Left arm, Patient Position: Sitting, Cuff Size: Adult Large)   Pulse 82   Temp 98.5  F (36.9  C) (Oral)   Ht 1.619 m (5' 3.75\")   Wt 70.8 kg (156 lb)   SpO2 98%   BMI 26.99 kg/m      GENERAL APPEARANCE: healthy, alert and no distress     EYES: EOMI, PERRL     HENT: ear canals and TM's normal and nose and mouth without ulcers or lesions     NECK: no adenopathy, no asymmetry, masses, or scars and thyroid normal to palpation     RESP: lungs clear to auscultation - no rales, rhonchi or wheezes     CV: regular rates and rhythm, normal S1 S2, no S3 or S4 and no murmur, click or rub     ABDOMEN:  soft, nontender, no HSM or masses and bowel sounds normal     MS: extremities normal- no gross deformities noted, no evidence of inflammation in joints, FROM in all extremities.     SKIN: no suspicious lesions or rashes     NEURO: Normal strength and tone, sensory exam grossly normal, mentation intact and speech normal     PSYCH: mentation appears normal. and affect normal/bright     LYMPHATICS: No cervical adenopathy    DIAGNOSTICS:   EKG: Not indicated due to non-vascular surgery and low risk of event (age " <65 and without cardiac risk factors)  Hemoglobin (indicated for history of anemia or procedure with significant blood loss such as tonsillectomy, major intraperitoneal surgery, vascular surgery, major spine surgery, total joint replacement)    Office Visit on 01/20/2020   Component Date Value Ref Range Status     WBC 01/20/2020 11.0  4.0 - 11 10*9/L Final     RBC Count 01/20/2020 5.08  3.8 - 5.2 10*12/L Final     Hemoglobin 01/20/2020 14.5  11.7 - 15.7 g/dL Final     Hematocrit 01/20/2020 44.8  35.0 - 47.0 % Final     MCV 01/20/2020 88.2  78 - 100 fL Final     MCH 01/20/2020 28.5  26 - 33 pg Final     MCHC 01/20/2020 32.4  31 - 36 g/dL Final     RDW 01/20/2020 13.0  % Final     Platelet Count 01/20/2020 266  150 - 375 10^9/L Final       IMPRESSION:   Reason for surgery/procedure: Right hip dysplasia, torn labrum.  Diagnosis/reason for consult: Pre-operative Examination    The proposed surgical procedure is considered INTERMEDIATE risk.    REVISED CARDIAC RISK INDEX  The patient has the following serious cardiovascular risks for perioperative complications such as (MI, PE, VFib and 3  AV Block):  No serious cardiac risks  INTERPRETATION: 0 risks: Class I (very low risk - 0.4% complication rate)    The patient has the following additional risks for perioperative complications:  No identified additional risks      ICD-10-CM    1. Pre-operative examination Z01.818    2. Congenital hip dysplasia Q65.89    3. Generalized anxiety disorder F41.1        RECOMMENDATIONS:     --Patient is to take all scheduled medications on the day of surgery EXCEPT for modifications listed below.    APPROVAL GIVEN to proceed with proposed procedure, without further diagnostic evaluation    1. Seven days before surgery do not take Aspirin or any over-the-counter pain medications other than Tylenol.  TYLENOL is the safest pain pill to use before surgery because it does not affect your bleeding time. If tylenol is not sufficient for pain  control talk to me or the surgeon and we will decide what is safe to use.    2. Do not eat anything after midnight  (ritchie of the surgery) and nothing the morning of the surgery.    3. Medications: Hold medications on day of surgery until after surgery, and hold spironolactone evening before surgery to avoid hypotension.    4. Follow all instructions given by the surgery team. They usually give out a packet. Read it and please follow it precisely. This helps surgical experience and outcomes.    5. If you have any questions do not hesitate to call me or the surgeon/surgical team.    Ramona Poe PA-C  [facility]         Signed Electronically by: Ramona Poe PA-C    Copy of this evaluation report is provided to requesting physician.    [facility] Preop Guidelines    Revised Cardiac Risk Index

## 2020-01-22 ENCOUNTER — TELEPHONE (OUTPATIENT)
Dept: FAMILY MEDICINE | Facility: CLINIC | Age: 20
End: 2020-01-22

## 2020-01-22 NOTE — TELEPHONE ENCOUNTER
Sana called to say that she had a blood exposure from a cadaver at school.  There was some pooling of blood in the brain so when they removed the skull, the blood splattered all over.  She had a cut on her finger and it did get in there.  She is having surgery so wanted to know the protocol and if she needed to postpone surgery.  I asked her to call her surgeon which she said she did and was told to call her PCP.  I discussed wit [facility].  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

## 2020-02-05 ENCOUNTER — TELEPHONE (OUTPATIENT)
Dept: FAMILY MEDICINE | Facility: CLINIC | Age: 20
End: 2020-02-05

## 2020-02-05 DIAGNOSIS — B37.31 CANDIDIASIS OF VAGINA: Primary | ICD-10-CM

## 2020-02-05 RX ORDER — CEPHALEXIN 500 MG/1
500 CAPSULE ORAL 2 TIMES DAILY
COMMUNITY
Start: 2020-01-31 | End: 2020-02-10

## 2020-02-05 RX ORDER — FLUCONAZOLE 150 MG/1
150 TABLET ORAL ONCE
Qty: 1 TABLET | Refills: 0 | Status: SHIPPED | OUTPATIENT
Start: 2020-02-05 | End: 2020-02-05

## 2020-02-05 NOTE — TELEPHONE ENCOUNTER
Sent fluconazole pill to pharmacy to take once for yeast infection. If symptoms not resolving, should schedule appt. Please inform

## 2020-02-05 NOTE — TELEPHONE ENCOUNTER
Pt is currently taking Keflex 3x daily for post surgery and believes she has developed a yeast infection. She wants to know what to do for that?      Try over the counter Monistat? Or come in for an office visit? Please advise, thanks.          Anaya's cell # [phone]

## 2020-03-22 ENCOUNTER — HEALTH MAINTENANCE LETTER (OUTPATIENT)
Age: 20
End: 2020-03-22

## 2021-03-25 ENCOUNTER — OFFICE VISIT (OUTPATIENT)
Dept: FAMILY MEDICINE | Facility: CLINIC | Age: 21
End: 2021-03-25

## 2021-03-25 DIAGNOSIS — R50.9 FEVER AND CHILLS: Primary | ICD-10-CM

## 2021-03-25 PROCEDURE — 99212 OFFICE O/P EST SF 10 MIN: CPT | Mod: 95 | Performed by: PHYSICIAN ASSISTANT

## 2021-03-25 RX ORDER — BUPROPION HYDROCHLORIDE 150 MG/1
TABLET ORAL
COMMUNITY
Start: 2021-03-22 | End: 2021-05-20

## 2021-03-25 RX ORDER — ETONOGESTREL/ETHINYL ESTRADIOL .12-.015MG
RING, VAGINAL VAGINAL
COMMUNITY
Start: 2021-03-15 | End: 2022-03-07

## 2021-03-25 NOTE — LETTER
March 25, 2021      Anaya Serna  [address] [facility] [address]        To Whom It May Concern:    Anaya Serna was seen in our clinic via telemedicine today.  On Monday 3/22 she received her second COVID vaccine. She experienced common side effects that have been reported with the vaccine. She should be excuses from any absence this week.  She may return to work without any restrictions as of 3/25/21.        Sincerely,        RANDY Wade

## 2021-03-25 NOTE — PROGRESS NOTES
Anaya Serna is a 21 year old female who is being evaluated via a billable video visit (audio/video synchronous).     Video Start Time: 2:02 PM      Physician has received verbal consent for a Video Visit from the patient? Yes        Originating Location (pt. Location): Home    Distant Location (provider location): [facility] office      Subjective     Anaya Serna is a 21 year old female who presents today for the following health issues:    HPI     Pt had COVID vaccine on Monday. 2nd vaccine.   AE including muscle aches, fatigue on Monday.  Tuesday Fever Temp was 101.   Yesterday fever broke -felt  Today feeling great.   No hx of COVID infection.   [employer]    Needs note for missed work.    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     Do you have any of the following symptoms in the last 7 days?    Fever or chills: Yes  Cough: No  Shortness of breath or difficulty breathing: No  Fatigue: Yes  Muscle or body aches: Yes  Headache: Yes  New loss of taste or smell: No  Sore throat: No  Congestion or runny nose: No  Nausea or vomiting: Yes - Nausea  Diarrhea: No      Patient Active Problem List   Diagnosis     Pain in joint, ankle and foot     Hip pain     Abnormal gait     Status post hip surgery     Hip pain, right     Health Care Home     ACP (advance care planning)     Congenital hip dysplasia     Generalized anxiety disorder     Past Surgical History:   Procedure Laterality Date     HIP SURGERY Left 2016    MD in [place]     other surgery      hardware removal of hip 2016       Social History     Tobacco Use     Smoking status: Never Smoker     Smokeless tobacco: Never Used   Substance Use Topics     Alcohol use: No     Frequency: Never     Family History   Problem Relation Age of Onset     Anxiety Disorder Mother      Hypertension Father      Lupus Maternal Grandmother      Chronic Bronchitis Maternal Grandmother         non-smoker     Obstructive Sleep Apnea  "Maternal Grandmother      Thyroid Disease Maternal Grandmother      Pulmonary Embolism Maternal Grandmother      Hypertension Maternal Grandfather      Alcoholism Maternal Grandfather      Obstructive Sleep Apnea Maternal Grandfather      Anxiety Disorder Paternal Grandmother      Other - See Comments Paternal Grandmother         insomnia     Prostate Cancer Paternal Grandfather      Melanoma Paternal Grandfather            Current Outpatient Medications   Medication Sig Dispense Refill     buPROPion (WELLBUTRIN XL) 150 MG 24 hr tablet        FLUoxetine (PROZAC) 20 MG capsule Take 3 capsules (60 mg) by mouth daily 270 capsule 3     LORAZEPAM PO Take 0.5 mg by mouth every 8 hours as needed for anxiety       multivitamin w/minerals (MULTI-VITAMIN) tablet Take 1 tablet by mouth daily       NUVARING 0.12-0.015 MG/24HR vaginal ring INSERT 1 RING VAGINALLY AS DIRECTED. REMOVE AFTER 3 WEEKS & WAIT 7 DAYS BEFORE INSERTING A NEW RING       spironolactone (ALDACTONE) 50 MG tablet Take 50 mg by mouth daily       Allergies   Allergen Reactions     Latex Hives and Swelling     Azithromycin Hives     Benadryl [Diphenhydramine]      Anxious, shaky     Recent Labs   Lab Test 12/18/18  1235 12/18/18  1229   A1C  --  5.3   TSH 1.13  --         BP Readings from Last 3 Encounters:   01/20/20 102/66   11/26/19 112/70   10/25/19 132/84    Wt Readings from Last 3 Encounters:   01/20/20 70.8 kg (156 lb)   11/26/19 71.2 kg (157 lb)   10/25/19 70.3 kg (155 lb)                   Objective    LMP 03/08/2021 (Exact Date)   Estimated body mass index is 26.99 kg/m  as calculated from the following:    Height as of 1/20/20: 1.619 m (5' 3.75\").    Weight as of 1/20/20: 70.8 kg (156 lb).  Physical Exam       GENERAL: Healthy, alert and no distress  EYES: Eyes grossly normal to inspection.  No discharge or erythema, or obvious scleral/conjunctival abnormalities.  RESP: No audible wheeze, cough, or visible cyanosis.  No visible retractions or " increased work of breathing.    SKIN: Visible skin clear. No significant rash, abnormal pigmentation or lesions.  NEURO: Cranial nerves grossly intact.  Mentation and speech appropriate for age.  PSYCH: Mentation appears normal, affect normal/bright, judgement and insight intact, normal speech and appearance well-groomed.              Assessment & Plan       ICD-10-CM    1. Fever and chills  R50.9         OK to RTW w/o restrictions  Discussed if any new sx occur to get COVID tested            RANDY Wade  [facility]        Video-Visit Details    Type of service:  Video Visit (audio/video)    Video End Time (time video stopped): 2:14 PM      Mode of Communication:  Doximity

## 2021-03-25 NOTE — NURSING NOTE
Anaya needs a letter to go back to work because she spiked a fever after getting her second covid vaccine. Dates missed 3/22-3/25.    Pre-Visit Screening:  Immunizations:informed about hep b  Colonoscopy:NA  Mammogram:NA  Asthma Action Test/Plan:Na  PHQ9:NA  GAD7:NA  Questioned patient about current smoking habits Pt.never smoked  OK to leave a detailed message on voice mail for today's visit yes, phone # [phone]

## 2021-04-16 ENCOUNTER — TELEPHONE (OUTPATIENT)
Dept: FAMILY MEDICINE | Facility: CLINIC | Age: 21
End: 2021-04-16

## 2021-04-16 NOTE — TELEPHONE ENCOUNTER
Anaya called stating that she is feeling very lightheaded today. She works at a hospital and just left work. Her bp before she left was 84/61. I advised salty foods and water. She has already tried that. Increased spirolactone to 100 mg in am and 50 mg in pm last week at [facility]. Wanting to know if she should go to Urgent Care?    Could you call pt to triage?    Thanks, Clau      [phone]

## 2021-04-16 NOTE — TELEPHONE ENCOUNTER
Called and spoke to Anaya. Increase dose exactly 1 week ago. Felt mild dizziness yesterday, seems worse today. Checked BP and was 84/61. 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 Advised go back down to 100 mg. Proceed to ER with worsening.

## 2021-05-15 ENCOUNTER — HEALTH MAINTENANCE LETTER (OUTPATIENT)
Age: 21
End: 2021-05-15

## 2021-05-20 ENCOUNTER — OFFICE VISIT (OUTPATIENT)
Dept: FAMILY MEDICINE | Facility: CLINIC | Age: 21
End: 2021-05-20

## 2021-05-20 VITALS
HEART RATE: 88 BPM | BODY MASS INDEX: 31.76 KG/M2 | OXYGEN SATURATION: 98 % | RESPIRATION RATE: 20 BRPM | TEMPERATURE: 98.1 F | HEIGHT: 64 IN | DIASTOLIC BLOOD PRESSURE: 68 MMHG | WEIGHT: 186 LBS | SYSTOLIC BLOOD PRESSURE: 118 MMHG

## 2021-05-20 DIAGNOSIS — R11.0 NAUSEA: ICD-10-CM

## 2021-05-20 DIAGNOSIS — E66.811 OBESITY (BMI 30.0-34.9): ICD-10-CM

## 2021-05-20 DIAGNOSIS — L73.9 FOLLICULITIS: Primary | ICD-10-CM

## 2021-05-20 DIAGNOSIS — F41.8 SITUATIONAL ANXIETY: ICD-10-CM

## 2021-05-20 PROCEDURE — 99213 OFFICE O/P EST LOW 20 MIN: CPT | Performed by: PHYSICIAN ASSISTANT

## 2021-05-20 RX ORDER — TRETINOIN 0.5 MG/G
CREAM TOPICAL
COMMUNITY
Start: 2021-04-23 | End: 2021-07-12

## 2021-05-20 RX ORDER — ONDANSETRON 4 MG/1
4 TABLET, ORALLY DISINTEGRATING ORAL EVERY 8 HOURS PRN
Qty: 10 TABLET | Refills: 1 | Status: SHIPPED | OUTPATIENT
Start: 2021-05-20 | End: 2022-03-07

## 2021-05-20 RX ORDER — SPIRONOLACTONE 100 MG/1
100 TABLET, FILM COATED ORAL DAILY
COMMUNITY
Start: 2021-05-20 | End: 2021-07-12

## 2021-05-20 RX ORDER — MUPIROCIN CALCIUM 20 MG/G
CREAM TOPICAL 3 TIMES DAILY
Qty: 30 G | Refills: 0 | Status: SHIPPED | OUTPATIENT
Start: 2021-05-20 | End: 2021-07-12

## 2021-05-20 RX ORDER — CLINDAMYCIN PHOSPHATE 10 UG/ML
LOTION TOPICAL
COMMUNITY
Start: 2021-04-09 | End: 2021-07-12

## 2021-05-20 ASSESSMENT — MIFFLIN-ST. JEOR: SCORE: 1583.69

## 2021-05-20 NOTE — PROGRESS NOTES
Assessment & Plan     Folliculitis  Trial of Bactroban  with hydrocortisone 1:1  Call in 1 week   If no improvement doxy (will get yeast infection ).   - mupirocin (BACTROBAN) 2 % external cream; Apply topically 3 times daily    Obesity (BMI 30.0-34.9)  Schedule with [facility]  - Other Specialty Referral    Situational anxiety  Nausea  - ondansetron (ZOFRAN-ODT) 4 MG ODT tab; Take 1 tablet (4 mg) by mouth every 8 hours as needed for nausea          Mary Ann Gannon, PA  [facility]    Subjective     Nursing Notes:   Camryn Hernandez, [facility]  5/20/2021  1:07 PM  Signed  Chief Complaint   Patient presents with     Weight Problem     wants to know options to try and lose weight, it unable to lose weight by working out because of hip dysplasia, ortho doctor told her to see primary for this     Derm Problem     slightly red and itchy bumps on legs that itch, unable to get into dermatology for the next two months       Pre-visit Screening:  Immunizations:  up to date  Colonoscopy:  NA  Mammogram: NA  Asthma Action Test/Plan:  NA  PHQ9:  NA  GAD7:  NA  Questioned patient about current smoking habits Pt. has never smoked.  Ok to leave detailed message on voice mail for today's visit only Yes, phone # [phone]           Anaya Serna is a 21 year old female who presents to clinic today for the following health issues:     HPI     1. Acne - seeing [facility] Dermatologist  Eczema as child  Does have drier skin      OTC:   Edna-cream  Cerve (anti-itch)  BPO wash      2. Partial left hip at 16 due to congenital dysplasia  Dr. Aggarwal - last year - removal last year  Now pain in the right side - clicking, subluxation and pain.   Met with Surgeon via telehealth a couple weeks ago      Wt Readings from Last 5 Encounters:   05/20/21 84.4 kg (186 lb)   01/20/20 70.8 kg (156 lb)   11/26/19 71.2 kg (157 lb)   10/25/19 70.3 kg (155 lb)   12/18/18 77.7 kg (171 lb 3.2 oz)       Body mass index is 31.93 kg/m .    3.  "Would like Zofran  Anxiety with interviewing for [facility] for Anxiety  In therapy        Objective    /68 (BP Location: Right arm, Patient Position: Sitting, Cuff Size: Adult Large)   Pulse 88   Temp 98.1  F (36.7  C) (Temporal)   Resp 20   Ht 1.626 m (5' 4\")   Wt 84.4 kg (186 lb)   LMP 05/06/2021 (Exact Date)   SpO2 98%   BMI 31.93 kg/m    Body mass index is 31.93 kg/m .  Physical Exam       Skin: bilateral thighs several papules around follicles - no erythema or swelling        "

## 2021-05-20 NOTE — NURSING NOTE
Chief Complaint   Patient presents with     Weight Problem     wants to know options to try and lose weight, it unable to lose weight by working out because of hip dysplasia, ortho doctor told her to see primary for this     Derm Problem     slightly red and itchy bumps on legs that itch, unable to get into dermatology for the next two months       Pre-visit Screening:  Immunizations:  up to date  Colonoscopy:  NA  Mammogram: NA  Asthma Action Test/Plan:  NA  PHQ9:  NA  GAD7:  NA  Questioned patient about current smoking habits Pt. has never smoked.  Ok to leave detailed message on voice mail for today's visit only Yes, phone # [phone]

## 2021-05-25 ENCOUNTER — MYC MEDICAL ADVICE (OUTPATIENT)
Dept: FAMILY MEDICINE | Facility: CLINIC | Age: 21
End: 2021-05-25

## 2021-05-25 DIAGNOSIS — B37.31 YEAST INFECTION OF THE VAGINA: ICD-10-CM

## 2021-05-25 DIAGNOSIS — L73.9 FOLLICULITIS: Primary | ICD-10-CM

## 2021-05-25 RX ORDER — DOXYCYCLINE HYCLATE 100 MG
100 TABLET ORAL 2 TIMES DAILY
Qty: 28 TABLET | Refills: 0 | Status: SHIPPED | OUTPATIENT
Start: 2021-05-25 | End: 2021-07-12

## 2021-05-25 RX ORDER — FLUCONAZOLE 150 MG/1
150 TABLET ORAL ONCE
Qty: 1 TABLET | Refills: 0 | Status: SHIPPED | OUTPATIENT
Start: 2021-05-25 | End: 2021-05-25

## 2021-07-12 ENCOUNTER — OFFICE VISIT (OUTPATIENT)
Dept: FAMILY MEDICINE | Facility: CLINIC | Age: 21
End: 2021-07-12

## 2021-07-12 VITALS
HEART RATE: 96 BPM | TEMPERATURE: 98.8 F | BODY MASS INDEX: 31.76 KG/M2 | OXYGEN SATURATION: 99 % | SYSTOLIC BLOOD PRESSURE: 110 MMHG | WEIGHT: 186 LBS | HEIGHT: 64 IN | DIASTOLIC BLOOD PRESSURE: 80 MMHG

## 2021-07-12 DIAGNOSIS — R61 NIGHT SWEATS: Primary | ICD-10-CM

## 2021-07-12 PROBLEM — M25.551 HIP PAIN, RIGHT: Status: RESOLVED | Noted: 2017-04-20 | Resolved: 2021-07-12

## 2021-07-12 LAB
% GRANULOCYTES: 51.2 %
ALBUMIN SERPL-MCNC: 4.3 G/DL (ref 3.6–5.1)
ALBUMIN/GLOB SERPL: 1.5 {RATIO} (ref 1–2.5)
ALP SERPL-CCNC: 59 U/L (ref 33–130)
ALT 1742-6: 15 U/L (ref 0–32)
AST 1920-8: 13 U/L (ref 0–35)
BILIRUB SERPL-MCNC: 0.4 MG/DL (ref 0.2–1.2)
BUN SERPL-MCNC: 8 MG/DL (ref 7–25)
BUN/CREATININE RATIO: 10.1 (ref 6–22)
CALCIUM SERPL-MCNC: 9.2 MG/DL (ref 8.6–10.3)
CHLORIDE SERPLBLD-SCNC: 108.7 MMOL/L (ref 98–110)
CO2 SERPL-SCNC: 22.4 MMOL/L (ref 20–32)
CREAT SERPL-MCNC: 0.79 MG/DL (ref 0.6–1.3)
GLOBULIN, CALCULATED - QUEST: 2.9 (ref 1.9–3.7)
GLUCOSE SERPL-MCNC: 117 MG/DL (ref 60–99)
HCT VFR BLD AUTO: 41 % (ref 35–47)
HEMOGLOBIN: 13.3 G/DL (ref 11.7–15.7)
LYMPHOCYTES NFR BLD AUTO: 40.7 %
MCH RBC QN AUTO: 28.1 PG (ref 26–33)
MCHC RBC AUTO-ENTMCNC: 32.4 G/DL (ref 31–36)
MCV RBC AUTO: 86.6 FL (ref 78–100)
MONOCYTES NFR BLD AUTO: 8.1 %
PLATELET COUNT - QUEST: 280 10^9/L (ref 150–375)
POTASSIUM SERPL-SCNC: 4.63 MMOL/L (ref 3.5–5.3)
PROT SERPL-MCNC: 7.2 G/DL (ref 6.1–8.1)
RBC # BLD AUTO: 4.74 10*12/L (ref 3.8–5.2)
SODIUM SERPL-SCNC: 140.4 MMOL/L (ref 135–146)
WBC # BLD AUTO: 6 10*9/L (ref 4–11)

## 2021-07-12 PROCEDURE — 99214 OFFICE O/P EST MOD 30 MIN: CPT | Performed by: FAMILY MEDICINE

## 2021-07-12 PROCEDURE — 80053 COMPREHEN METABOLIC PANEL: CPT | Performed by: FAMILY MEDICINE

## 2021-07-12 PROCEDURE — 85025 COMPLETE CBC W/AUTO DIFF WBC: CPT | Performed by: FAMILY MEDICINE

## 2021-07-12 PROCEDURE — 71046 X-RAY EXAM CHEST 2 VIEWS: CPT | Performed by: FAMILY MEDICINE

## 2021-07-12 PROCEDURE — 36415 COLL VENOUS BLD VENIPUNCTURE: CPT | Performed by: FAMILY MEDICINE

## 2021-07-12 RX ORDER — PHENTERMINE HYDROCHLORIDE 37.5 MG/1
1 TABLET ORAL DAILY
COMMUNITY
Start: 2021-06-03 | End: 2022-03-07

## 2021-07-12 ASSESSMENT — MIFFLIN-ST. JEOR: SCORE: 1583.69

## 2021-07-12 NOTE — PATIENT INSTRUCTIONS
"Assessment & Plan   Problem List Items Addressed This Visit     None      Visit Diagnoses     Night sweats    -  Primary    Relevant Orders    HEMOGRAM PLATELET DIFF (BFP) (Completed)    VENOUS COLLECTION (Completed)    Comprehensive Metobolic Panel (BFP)    XR Chest 2 Views        Results for orders placed or performed in visit on 07/12/21   HEMOGRAM PLATELET DIFF (BFP)     Status: None   Result Value Ref Range    WBC 6.0 4.0 - 11 10*9/L    RBC Count 4.74 3.8 - 5.2 10*12/L    Hemoglobin 13.3 11.7 - 15.7 g/dL    Hematocrit 41.0 35.0 - 47.0 %    MCV 86.6 78 - 100 fL    MCH 28.1 26 - 33 pg    MCHC 32.4 31 - 36 g/dL    Platelet Count 280 150 - 375 10^9/L    % Granulocytes 51.2 %    % Lymphocytes 40.7 %    % Monocytes 8.1 %     1. Night sweats  She has had some workup through endocrinology, has discussed her symptoms with dermatology.otherwise doesn't feel sick.  Chest xray done today--negative, labs pending. She will followup with endocrinology at an upcoming apt.  - HEMOGRAM PLATELET DIFF (BFP)  - VENOUS COLLECTION  - Comprehensive Metobolic Panel (BFP)  - XR Chest 2 Views           BMI:   Estimated body mass index is 31.93 kg/m  as calculated from the following:    Height as of this encounter: 1.626 m (5' 4\").    Weight as of this encounter: 84.4 kg (186 lb).   Weight management plan: Discussed healthy diet and exercise guidelines      FUTURE APPOINTMENTS:       - Follow-up visit as needed    No follow-ups on file.    Delia Knowles MD  [facility]  "

## 2021-07-12 NOTE — PROGRESS NOTES
"Assessment & Plan   Problem List Items Addressed This Visit     None      Visit Diagnoses     Night sweats    -  Primary    Relevant Orders    HEMOGRAM PLATELET DIFF (BFP) (Completed)    VENOUS COLLECTION (Completed)    Comprehensive Metobolic Panel (BFP)    XR Chest 2 Views        Results for orders placed or performed in visit on 07/12/21   HEMOGRAM PLATELET DIFF (BFP)     Status: None   Result Value Ref Range    WBC 6.0 4.0 - 11 10*9/L    RBC Count 4.74 3.8 - 5.2 10*12/L    Hemoglobin 13.3 11.7 - 15.7 g/dL    Hematocrit 41.0 35.0 - 47.0 %    MCV 86.6 78 - 100 fL    MCH 28.1 26 - 33 pg    MCHC 32.4 31 - 36 g/dL    Platelet Count 280 150 - 375 10^9/L    % Granulocytes 51.2 %    % Lymphocytes 40.7 %    % Monocytes 8.1 %     1. Night sweats  She has had some workup through endocrinology, has discussed her symptoms with dermatology.otherwise doesn't feel sick.  Chest xray done today--negative, labs pending. She will followup with endocrinology at an upcoming apt.  - HEMOGRAM PLATELET DIFF (BFP)  - VENOUS COLLECTION  - Comprehensive Metobolic Panel (BFP)  - XR Chest 2 Views           BMI:   Estimated body mass index is 31.93 kg/m  as calculated from the following:    Height as of this encounter: 1.626 m (5' 4\").    Weight as of this encounter: 84.4 kg (186 lb).   Weight management plan: Discussed healthy diet and exercise guidelines      FUTURE APPOINTMENTS:       - Follow-up visit as needed    No follow-ups on file.    Delia Knowles MD  [facility]    Subjective     There are no exam notes on file for this visit.     Anaya Serna is a 21 year old female who presents to clinic today for the following health issues   HPI     Here for night sweats. Was at dermatology and she said to have this checked out. Then was at endocrinology office and had labs done, all normal. No chance pregnancy, thryoid was normal range. Multiple other labs done.   Saw them for weight " "management--endocrinology.  Started a medication--phentermine--but the sweats started before this med and also prior to the nuvaring.  Labs done at end of May.  Is otherwise feeling ok. Lately has had some twitching and tremors.   No chance pregnancy. Feels colder than usual.  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  Has had covid vaccines in March.  Normally doesn't think she is a sweaty person.    This has been for 6 months.    Review of Systems   Constitutional, HEENT, cardiovascular, pulmonary, gi and gu systems are negative, except as otherwise noted.  Except joint muscle pain, bloating,anxiety, tremor      Objective    /80 (BP Location: Right arm, Patient Position: Sitting, Cuff Size: Adult Large)   Pulse 96   Temp 98.8  F (37.1  C) (Temporal)   Ht 1.626 m (5' 4\")   Wt 84.4 kg (186 lb)   LMP 05/15/2021   SpO2 99%   BMI 31.93 kg/m    Body mass index is 31.93 kg/m .  Physical Exam   GENERAL: healthy, alert and no distress  NECK: no adenopathy, no asymmetry, masses, or scars and thyroid normal to palpation  RESP: lungs clear to auscultation - no rales, rhonchi or wheezes  CV: regular rate and rhythm, normal S1 S2, no S3 or S4, no murmur, click or rub, no peripheral edema and peripheral pulses strong  ABDOMEN: soft, nontender, no hepatosplenomegaly, no masses and bowel sounds normal  MS: no gross musculoskeletal defects noted, no edema  NEURO: Normal strength and tone, mentation intact and speech normal  PSYCH: mentation appears normal, affect normal/bright  LYMPH: no cervical, supraclavicular, axillary, or inguinal adenopathy    Xray - Reviewed and interpreted by me.  Neg chest xray  Results for orders placed or performed in visit on 07/12/21 (from the past 24 hour(s))   HEMOGRAM PLATELET DIFF (BFP)   Result Value Ref Range    WBC 6.0 4.0 - 11 10*9/L    RBC Count 4.74 3.8 - 5.2 10*12/L    Hemoglobin 13.3 11.7 - 15.7 g/dL    Hematocrit 41.0 35.0 - 47.0 %    MCV 86.6 78 - 100 fL    MCH 28.1 26 " - 33 pg    MCHC 32.4 31 - 36 g/dL    Platelet Count 280 150 - 375 10^9/L    % Granulocytes 51.2 %    % Lymphocytes 40.7 %    % Monocytes 8.1 %

## 2021-09-04 ENCOUNTER — HEALTH MAINTENANCE LETTER (OUTPATIENT)
Age: 21
End: 2021-09-04

## 2022-03-07 ENCOUNTER — OFFICE VISIT (OUTPATIENT)
Dept: FAMILY MEDICINE | Facility: CLINIC | Age: 22
End: 2022-03-07

## 2022-03-07 VITALS
SYSTOLIC BLOOD PRESSURE: 116 MMHG | WEIGHT: 169.4 LBS | OXYGEN SATURATION: 99 % | DIASTOLIC BLOOD PRESSURE: 78 MMHG | HEIGHT: 64 IN | BODY MASS INDEX: 28.92 KG/M2 | HEART RATE: 98 BPM | TEMPERATURE: 98.8 F

## 2022-03-07 DIAGNOSIS — F41.8 SITUATIONAL ANXIETY: ICD-10-CM

## 2022-03-07 DIAGNOSIS — R11.0 NAUSEA: ICD-10-CM

## 2022-03-07 DIAGNOSIS — R30.0 DYSURIA: Primary | ICD-10-CM

## 2022-03-07 DIAGNOSIS — Z86.19 HISTORY OF CANDIDAL VULVOVAGINITIS: ICD-10-CM

## 2022-03-07 LAB
APPEARANCE UR: ABNORMAL
BACTERIA, UR: ABNORMAL
BILIRUB UR QL: ABNORMAL
CASTS/LPF: 0
COLOR UR: YELLOW
EP/HPF: ABNORMAL
GLUCOSE URINE: ABNORMAL MG/DL
HGB UR QL: ABNORMAL
KETONES UR QL: ABNORMAL MG/DL
MISC.: ABNORMAL
NITRITE UR QL STRIP: ABNORMAL
PH UR STRIP: 5.5 PH (ref 5–7)
PROT UR QL: ABNORMAL MG/DL
RBC, UR MICRO: ABNORMAL (ref ?–2)
SP GR UR STRIP: ABNORMAL (ref 1–1.03)
UROBILINOGEN UR QL STRIP: 0.2 EU/DL (ref 0.2–1)
WBC #/AREA URNS HPF: ABNORMAL /[HPF]
WBC, UR MICRO: ABNORMAL (ref ?–2)

## 2022-03-07 PROCEDURE — 81001 URINALYSIS AUTO W/SCOPE: CPT | Performed by: STUDENT IN AN ORGANIZED HEALTH CARE EDUCATION/TRAINING PROGRAM

## 2022-03-07 PROCEDURE — 99213 OFFICE O/P EST LOW 20 MIN: CPT | Performed by: STUDENT IN AN ORGANIZED HEALTH CARE EDUCATION/TRAINING PROGRAM

## 2022-03-07 RX ORDER — ONDANSETRON 4 MG/1
4 TABLET, ORALLY DISINTEGRATING ORAL EVERY 8 HOURS PRN
Qty: 20 TABLET | Refills: 1 | Status: SHIPPED | OUTPATIENT
Start: 2022-03-07 | End: 2022-09-28

## 2022-03-07 RX ORDER — NITROFURANTOIN 25; 75 MG/1; MG/1
100 CAPSULE ORAL 2 TIMES DAILY
Qty: 10 CAPSULE | Refills: 0 | Status: SHIPPED | OUTPATIENT
Start: 2022-03-07 | End: 2022-03-12

## 2022-03-07 RX ORDER — FLUCONAZOLE 150 MG/1
150 TABLET ORAL ONCE
Qty: 1 TABLET | Refills: 0 | Status: SHIPPED | OUTPATIENT
Start: 2022-03-07 | End: 2022-03-07

## 2022-03-07 NOTE — PROGRESS NOTES
"Assessment & Plan     1. Dysuria  Discussed UA results an option of waiting for culture, pt elects to start abx now. Stop/change per cx results.   - URINALYSIS, ROUTINE (BFP)  - URINE CULTURE AEROBIC BACTERIAL ([facility])  - nitroFURantoin macrocrystal-monohydrate (MACROBID) 100 MG capsule; Take 1 capsule (100 mg) by mouth 2 times daily for 5 days  Dispense: 10 capsule; Refill: 0    2. History of candidal vulvovaginitis  Related to abx  - fluconazole (DIFLUCAN) 150 MG tablet; Take 1 tablet (150 mg) by mouth once for 1 dose  Dispense: 1 tablet; Refill: 0    3. Situational anxiety  4. Nausea  Refill zofran, offered anxiety support resources  - ondansetron (ZOFRAN-ODT) 4 MG ODT tab; Take 1 tablet (4 mg) by mouth every 8 hours as needed for nausea  Dispense: 20 tablet; Refill: 1      Morris Griffith MD, [facility]       Subjective     Anaya White is a 22 year old female who presents to clinic today for the following health issues:    HPI   Genitourinary - Female  Onset/Duration: started earlier today  Description:   Painful urination (Dysuria): no           Frequency: YES, and feels like incomplete emptying  Blood in urine (Hematuria): no  Delay in urine (Hesitency): no  Progression of Symptoms:  New today  Accompanying Signs & Symptoms:  Fever/chills: Had fever 2 days ago, sore throat. Negative covid test.   Flank pain: no  Nausea and vomiting: no  Vaginal symptoms: none  Abdominal/Pelvic Pain: suprapubic pressure  History:   History of frequent UTI s: no prior UTI  History of kidney stones: no  Sexually Active: no  Possibility of pregnancy: No  Precipitating or alleviating factors: None          Objective    /78 (BP Location: Right arm, Patient Position: Sitting, Cuff Size: Adult Large)   Pulse 98   Temp 98.8  F (37.1  C) (Temporal)   Ht 1.626 m (5' 4\")   Wt 76.8 kg (169 lb 6.4 oz)   SpO2 99%   BMI 29.08 kg/m    Body mass index is 29.08 kg/m .  Physical Exam   Alert, " NAD  NC/AT  Sclerae anicteric  Regular  Resp nonlabored  Skin warm and dry  No focal neuro deficits. Speech intact. Normal gait.  Appropriate affect    Results for orders placed or performed in visit on 03/07/22   URINALYSIS, ROUTINE (BFP)     Status: Abnormal   Result Value Ref Range    Color Urine Yellow     Appearance Urine Slightly Cloudy (A)     Glucose Urine Neg neg mg/dL    Bilirubin Urine Neg neg    Ketones Urine Neg neg mg/dL    Specific Gravity Urine Other (A) 1.003 - 1.035    Blood Urine Trace (A) neg    pH Urine 5.5 5.0 - 7.0 pH    Protein Urine neg neg - neg mg/dL    Urobilinogen Urine 0.2 0.2 - 1.0 EU/dL    Nitrite Urine Neg NEG    Leukocytes neg     Wbc, Urine Micro 2-4 (A) neg - 2    RBC Micro Urine 0-2 neg - 2    EP/HPF few     Bacteria Urine moderate (A) neg - neg    Casts/LPF 0     Miscellaneous some mucus strands (A)    URINE CULTURE AEROBIC BACTERIAL ([facility])     Status: None   Result Value Ref Range    Urine Voided Culture SEE NOTE

## 2022-03-07 NOTE — PATIENT INSTRUCTIONS
Start macrobid twice daily    Will see what culture shows    If any new or worsening symptoms develop let me know

## 2022-03-07 NOTE — NURSING NOTE
Chief Complaint   Patient presents with     UTI     pelvic pressure, urinary frequency, left sided pelvic discomfort, not able to completely empty her bladder, fever resolved over the weekend      Anxiety     anxiety is at a high, using more ativan than normal, stressed more than usual     Pre-visit Screening:  Immunizations:  up to date  Colonoscopy:  NA  Mammogram: NA  Asthma Action Test/Plan:  NA  PHQ9:  NA  GAD7:  NA  Questioned patient about current smoking habits Pt. has never smoked.  Ok to leave detailed message on voice mail for today's visit only Yes, phone # [phone]

## 2022-03-10 LAB — URINE VOIDED CULTURE: NORMAL

## 2022-04-11 ENCOUNTER — OFFICE VISIT (OUTPATIENT)
Dept: FAMILY MEDICINE | Facility: CLINIC | Age: 22
End: 2022-04-11

## 2022-04-11 VITALS
BODY MASS INDEX: 28.07 KG/M2 | WEIGHT: 164.4 LBS | HEIGHT: 64 IN | SYSTOLIC BLOOD PRESSURE: 126 MMHG | HEART RATE: 80 BPM | DIASTOLIC BLOOD PRESSURE: 72 MMHG | TEMPERATURE: 98.3 F | RESPIRATION RATE: 20 BRPM

## 2022-04-11 DIAGNOSIS — Z11.59 NEED FOR HEPATITIS B SCREENING TEST: ICD-10-CM

## 2022-04-11 DIAGNOSIS — E66.3 OVERWEIGHT (BMI 25.0-29.9): ICD-10-CM

## 2022-04-11 DIAGNOSIS — F41.1 GENERALIZED ANXIETY DISORDER: ICD-10-CM

## 2022-04-11 DIAGNOSIS — E53.8 VITAMIN B12 DEFICIENCY (NON ANEMIC): ICD-10-CM

## 2022-04-11 DIAGNOSIS — Z11.1 SCREENING EXAMINATION FOR PULMONARY TUBERCULOSIS: ICD-10-CM

## 2022-04-11 DIAGNOSIS — Z01.419 ENCOUNTER FOR GYNECOLOGICAL EXAMINATION WITHOUT ABNORMAL FINDING: Primary | ICD-10-CM

## 2022-04-11 DIAGNOSIS — Q65.89 CONGENITAL HIP DYSPLASIA: ICD-10-CM

## 2022-04-11 DIAGNOSIS — F41.0 PANIC ATTACK: ICD-10-CM

## 2022-04-11 LAB
ERYTHROCYTE [DISTWIDTH] IN BLOOD BY AUTOMATED COUNT: 13.5 %
HCT VFR BLD AUTO: 43.2 % (ref 35–47)
HEMOGLOBIN: 14.5 G/DL (ref 11.7–15.7)
MCH RBC QN AUTO: 29.8 PG (ref 26–33)
MCHC RBC AUTO-ENTMCNC: 33.6 G/DL (ref 31–36)
MCV RBC AUTO: 88.8 FL (ref 78–100)
PLATELET COUNT - QUEST: 288 10^9/L (ref 150–375)
RBC # BLD AUTO: 4.87 10*12/L (ref 3.8–5.2)
WBC # BLD AUTO: 9 10*9/L (ref 4–11)

## 2022-04-11 PROCEDURE — 99395 PREV VISIT EST AGE 18-39: CPT | Performed by: PHYSICIAN ASSISTANT

## 2022-04-11 PROCEDURE — 36415 COLL VENOUS BLD VENIPUNCTURE: CPT | Performed by: PHYSICIAN ASSISTANT

## 2022-04-11 PROCEDURE — 85027 COMPLETE CBC AUTOMATED: CPT | Performed by: PHYSICIAN ASSISTANT

## 2022-04-11 RX ORDER — CLONAZEPAM 0.5 MG/1
0.5 TABLET ORAL 2 TIMES DAILY PRN
Qty: 10 TABLET | Refills: 0 | Status: SHIPPED | OUTPATIENT
Start: 2022-04-11 | End: 2022-06-30

## 2022-04-11 ASSESSMENT — ANXIETY QUESTIONNAIRES
7. FEELING AFRAID AS IF SOMETHING AWFUL MIGHT HAPPEN: NOT AT ALL
6. BECOMING EASILY ANNOYED OR IRRITABLE: NOT AT ALL
GAD7 TOTAL SCORE: 0
3. WORRYING TOO MUCH ABOUT DIFFERENT THINGS: NOT AT ALL
5. BEING SO RESTLESS THAT IT IS HARD TO SIT STILL: NOT AT ALL
1. FEELING NERVOUS, ANXIOUS, OR ON EDGE: NOT AT ALL
IF YOU CHECKED OFF ANY PROBLEMS ON THIS QUESTIONNAIRE, HOW DIFFICULT HAVE THESE PROBLEMS MADE IT FOR YOU TO DO YOUR WORK, TAKE CARE OF THINGS AT HOME, OR GET ALONG WITH OTHER PEOPLE: NOT DIFFICULT AT ALL
2. NOT BEING ABLE TO STOP OR CONTROL WORRYING: NOT AT ALL

## 2022-04-11 ASSESSMENT — PATIENT HEALTH QUESTIONNAIRE - PHQ9
SUM OF ALL RESPONSES TO PHQ QUESTIONS 1-9: 0
5. POOR APPETITE OR OVEREATING: NOT AT ALL

## 2022-04-11 NOTE — PROGRESS NOTES
SUBJECTIVE:   CC: Anaya White is an 22 year old woman who presents for preventive health visit.           Patient has been advised of split billing requirements and indicates understanding: Yes  HPI    Citalopram freshman year hs  Switched to Prozac - in this since  Anxiety worse in college - Ativan prn   Once a month Ativan    Last year on Wellbutrin - short period due to working from home and Seasonal  Did work.  Sx improved after going to back to work.     Working at [employer]  Got   Scribe at [employer] previous  Going to Med school - decided  To go to nursing school    Needs forms signed for nursing school.         Today's PHQ-2 Score:   PHQ-2 ( 1999 [employer]) 3/7/2022   Q1: Little interest or pleasure in doing things 0   Q2: Feeling down, depressed or hopeless 0   PHQ-2 Score 0   PHQ-2 Total Score (12-17 Years)- Positive if 3 or more points; Administer PHQ-A if positive -       Abuse: Current or Past (Physical, Sexual or Emotional) - No  Do you feel safe in your environment? Yes        History of abnormal Pap smear: NO - age 21-29 PAP every 3 years recommended    Reviewed orders with patient.  Reviewed health maintenance and updated orders accordingly - Yes      Lab work is in process  Labs reviewed in EPIC  BP Readings from Last 3 Encounters:   04/11/22 126/72   03/07/22 116/78   07/12/21 110/80    Wt Readings from Last 3 Encounters:   04/11/22 74.6 kg (164 lb 6.4 oz)   03/07/22 76.8 kg (169 lb 6.4 oz)   07/12/21 84.4 kg (186 lb)                  Patient Active Problem List   Diagnosis     Status post hip surgery     Health Care Home     ACP (advance care planning)     Congenital hip dysplasia     Generalized anxiety disorder     Panic attack     Overweight (BMI 25.0-29.9)     Vitamin B12 deficiency (non anemic)     Past Surgical History:   Procedure Laterality Date     HIP SURGERY Left 2016    MD in [place]     HIP SURGERY Left 2020    labrum     other surgery      hardware removal of hip  2016       Social History     Tobacco Use     Smoking status: Never Smoker     Smokeless tobacco: Never Used   Substance Use Topics     Alcohol use: No     Family History   Problem Relation Age of Onset     Anxiety Disorder Mother      Obesity Mother      Hypertension Father      Anxiety Disorder Father      Anxiety Disorder Sister      Lupus Maternal Grandmother      Chronic Bronchitis Maternal Grandmother         non-smoker     Obstructive Sleep Apnea Maternal Grandmother      Thyroid Disease Maternal Grandmother      Pulmonary Embolism Maternal Grandmother      Obesity Maternal Grandmother      Hypertension Maternal Grandfather      Alcoholism Maternal Grandfather      Obstructive Sleep Apnea Maternal Grandfather      Obesity Maternal Grandfather      Anxiety Disorder Paternal Grandmother      Other - See Comments Paternal Grandmother         insomnia     Prostate Cancer Paternal Grandfather      Melanoma Paternal Grandfather          Current Outpatient Medications   Medication Sig Dispense Refill     clonazePAM (KLONOPIN) 0.5 MG tablet Take 1 tablet (0.5 mg) by mouth 2 times daily as needed for anxiety 10 tablet 0     FLUoxetine (PROZAC) 20 MG capsule Take 3 capsules (60 mg) by mouth in the morning. 270 capsule 3     levonorgestrel (MIRENA) 20 MCG/24HR IUD 1 each by Intrauterine route once       LORAZEPAM PO Take 0.5 mg by mouth every 8 hours as needed for anxiety       ondansetron (ZOFRAN-ODT) 4 MG ODT tab Take 1 tablet (4 mg) by mouth every 8 hours as needed for nausea 20 tablet 1     Allergies   Allergen Reactions     Latex Hives and Swelling     Azithromycin Hives     Recent Labs   Lab Test 07/12/21  1248 12/18/18  1235 12/18/18  1229   A1C  --   --  5.3   CR 0.79  --   --    POTASSIUM 4.63  --   --    TSH  --  1.13  --                            Past Medical History:   Diagnosis Date     Abnormal gait 5/3/2016     Hip dysplasia      Hip dysplasia      Pneumonia 2015, 2017    hospitalized 2015      Past  "Surgical History:   Procedure Laterality Date     HIP SURGERY Left 2016    MD in [place]     HIP SURGERY Left 2020    labrum     other surgery      hardware removal of hip 2016       Review of Systems  CONSTITUTIONAL: NEGATIVE for fever, chills, change in weight  INTEGUMENTARU/SKIN: NEGATIVE for worrisome rashes, moles or lesions  EYES: NEGATIVE for vision changes or irritation  ENT: NEGATIVE for ear, mouth and throat problems  RESP: NEGATIVE for significant cough or SOB  BREAST: NEGATIVE for masses, tenderness or discharge  CV: NEGATIVE for chest pain, palpitations or peripheral edema  GI: NEGATIVE for nausea, abdominal pain, heartburn, or change in bowel habits  : NEGATIVE for unusual urinary or vaginal symptoms. Periods are regular.  MUSCULOSKELETAL: NEGATIVE for significant arthralgias or myalgia  NEURO: NEGATIVE for weakness, dizziness or paresthesias  PSYCHIATRIC: NEGATIVE for changes in mood or affect     OBJECTIVE:   /72 (BP Location: Right arm, Patient Position: Chair, Cuff Size: Adult Regular)   Pulse 80   Temp 98.3  F (36.8  C)   Resp 20   Ht 1.626 m (5' 4\")   Wt 74.6 kg (164 lb 6.4 oz)   BMI 28.22 kg/m    Physical Exam  GENERAL: healthy, alert and no distress  EYES: Eyes grossly normal to inspection, PERRL and conjunctivae and sclerae normal  HENT: ear canals and TM's normal, nose and mouth without ulcers or lesions  NECK: no adenopathy, no asymmetry, masses, or scars and thyroid normal to palpation  RESP: lungs clear to auscultation - no rales, rhonchi or wheezes  BREAST: normal without masses, tenderness or nipple discharge and no palpable axillary masses or adenopathy  CV: regular rate and rhythm, normal S1 S2, no S3 or S4, no murmur, click or rub, no peripheral edema and peripheral pulses strong  ABDOMEN: soft, nontender, no hepatosplenomegaly, no masses and bowel sounds normal   (female): normal female external genitalia, normal urethral meatus, vaginal mucosa pink, moist, well " "rugated, and normal cervix/adnexa/uterus without masses or discharge  MS: no gross musculoskeletal defects noted, no edema  SKIN: no suspicious lesions or rashes  NEURO: Normal strength and tone, mentation intact and speech normal  PSYCH: mentation appears normal, affect normal/bright    Diagnostic Test Results:  Labs reviewed in Epic    ASSESSMENT/PLAN:   Anaya was seen today for physical.    Diagnoses and all orders for this visit:    Encounter for gynecological examination without abnormal finding  -     Hemogram with Platelets ([facility])  -     VITAMIN B12 ([facility])  -     VENOUS COLLECTION    Generalized anxiety disorder  -     FLUoxetine (PROZAC) 20 MG capsule; Take 3 capsules (60 mg) by mouth in the morning.  -     clonazePAM (KLONOPIN) 0.5 MG tablet; Take 1 tablet (0.5 mg) by mouth 2 times daily as needed for anxiety    I will assume care of AUDIE - see me in 6 months      Congenital hip dysplasia    Panic attack    Vitamin B12 deficiency (non anemic)  -     VITAMIN B12 ([facility])  -     VENOUS COLLECTION    Need for hepatitis B screening test  -     Hepatitis B Surface [place], Quant ([facility])    Screening examination for pulmonary tuberculosis    Overweight (BMI 25.0-29.9)        Patient has been advised of split billing requirements and indicates understanding: Yes    COUNSELING:  Reviewed preventive health counseling, as reflected in patient instructions    Estimated body mass index is 28.22 kg/m  as calculated from the following:    Height as of this encounter: 1.626 m (5' 4\").    Weight as of this encounter: 74.6 kg (164 lb 6.4 oz).    Weight management plan: Discussed healthy diet and exercise guidelines    She reports that she has never smoked. She has never used smokeless tobacco.      Counseling Resources:  ATP IV Guidelines  Pooled Cohorts Equation Calculator  Breast Cancer Risk Calculator  BRCA-Related Cancer Risk Assessment: FHS-7 Tool  FRAX Risk Assessment  ICSI Preventive Guidelines  Dietary Guidelines for " Americans, 2010  USDA's MyPlate  ASA Prophylaxis  Lung CA Screening    Mary Ann Gannon, PA  [facility]

## 2022-04-11 NOTE — NURSING NOTE
Anaya White is here for a CPX.    Questioned patient about current smoking habits.  Pt. has never smoked.  PULSE regular  My Chart: active  CLASSIFICATION OF OVERWEIGHT AND OBESITY BY BMI                        Obesity Class           BMI(kg/m2)  Underweight                                    < 18.5  Normal                                         18.5-24.9  Overweight                                     25.0-29.9  OBESITY                     I                  30.0-34.9                             II                 35.0-39.9  EXTREME OBESITY             III                >40                            Patient's  BMI Body mass index is 28.22 kg/m .  http://hin.nhlbi.nih.gov/menuplanner/menu.cgi  Pre-visit planning  Immunizations - up to date

## 2022-04-12 LAB — VIT B12 SERPL-MCNC: 264 PG/ML (ref 200–1100)

## 2022-04-12 ASSESSMENT — ANXIETY QUESTIONNAIRES: GAD7 TOTAL SCORE: 0

## 2022-04-13 LAB
CLINICAL HISTORY - QUEST: NORMAL
COMMENT - QUEST: NORMAL
CYTOTECHNOLOGIST - QUEST: NORMAL
DESCRIPTIVE DIAGNOSIS - QUEST: NORMAL
LAST PAP DX - QUEST: NORMAL
LMP - QUEST: NORMAL
PREV BX DX - QUEST: NORMAL
REVIEW CYTOTECHNOLOGIST - QUEST: NORMAL
SOURCE: NORMAL
STATEMENT OF ADEQUACY - QUEST: NORMAL

## 2022-06-20 ENCOUNTER — MYC REFILL (OUTPATIENT)
Dept: FAMILY MEDICINE | Facility: CLINIC | Age: 22
End: 2022-06-20

## 2022-06-20 DIAGNOSIS — F41.1 GENERALIZED ANXIETY DISORDER: ICD-10-CM

## 2022-06-20 RX ORDER — CLONAZEPAM 0.5 MG/1
0.5 TABLET ORAL 2 TIMES DAILY PRN
Qty: 10 TABLET | Refills: 0 | Status: CANCELLED | OUTPATIENT
Start: 2022-06-20

## 2022-06-23 ENCOUNTER — TRANSFERRED RECORDS (OUTPATIENT)
Dept: FAMILY MEDICINE | Facility: CLINIC | Age: 22
End: 2022-06-23

## 2022-06-30 ENCOUNTER — OFFICE VISIT (OUTPATIENT)
Dept: FAMILY MEDICINE | Facility: CLINIC | Age: 22
End: 2022-06-30

## 2022-06-30 DIAGNOSIS — F41.1 GENERALIZED ANXIETY DISORDER: Primary | ICD-10-CM

## 2022-06-30 DIAGNOSIS — F40.228 AEROPHOBIA: ICD-10-CM

## 2022-06-30 PROCEDURE — 99212 OFFICE O/P EST SF 10 MIN: CPT | Mod: 95 | Performed by: PHYSICIAN ASSISTANT

## 2022-06-30 RX ORDER — CLONAZEPAM 0.5 MG/1
0.5 TABLET ORAL 2 TIMES DAILY PRN
Qty: 20 TABLET | Refills: 0 | Status: SHIPPED | OUTPATIENT
Start: 2022-06-30 | End: 2022-11-29

## 2022-06-30 NOTE — PROGRESS NOTES
Anaya White is a 22 year old female who is being evaluated via a billable video visit (audio/video synchronous).       Distant Location (provider location): [facility] office      Subjective     Anaya White is a 22 year old female who presents today for the following health issues:    HPI         Anxiety Follow-Up    Klonopin use - requested refills  Went to [place]  For honeymoon - longer flights so used rx    Likes longer acting benzo over Ativan due to length of action      Social History     Tobacco Use     Smoking status: Never Smoker     Smokeless tobacco: Never Used   Substance Use Topics     Alcohol use: No     Drug use: No     AUDIE-7 SCORE 12/18/2018 4/11/2022   Total Score 0 0     PHQ 12/18/2018 4/11/2022   PHQ-9 Total Score 0 0   Q9: Thoughts of better off dead/self-harm past 2 weeks Not at all Not at all         Patient Active Problem List   Diagnosis     Status post hip surgery     Health Care Home     ACP (advance care planning)     Congenital hip dysplasia     Generalized anxiety disorder     Panic attack     Overweight (BMI 25.0-29.9)     Vitamin B12 deficiency (non anemic)     Past Surgical History:   Procedure Laterality Date     HIP SURGERY Left 2016    MD in [place]     HIP SURGERY Left 2020    labrum     other surgery      hardware removal of hip 2016       Social History     Tobacco Use     Smoking status: Never Smoker     Smokeless tobacco: Never Used   Substance Use Topics     Alcohol use: No     Family History   Problem Relation Age of Onset     Anxiety Disorder Mother      Obesity Mother      Hypertension Father      Anxiety Disorder Father      Anxiety Disorder Sister      Lupus Maternal Grandmother      Chronic Bronchitis Maternal Grandmother         non-smoker     Obstructive Sleep Apnea Maternal Grandmother      Thyroid Disease Maternal Grandmother      Pulmonary Embolism Maternal Grandmother      Obesity Maternal Grandmother      Hypertension Maternal  "Grandfather      Alcoholism Maternal Grandfather      Obstructive Sleep Apnea Maternal Grandfather      Obesity Maternal Grandfather      Anxiety Disorder Paternal Grandmother      Other - See Comments Paternal Grandmother         insomnia     Prostate Cancer Paternal Grandfather      Melanoma Paternal Grandfather            Current Outpatient Medications   Medication Sig Dispense Refill     clonazePAM (KLONOPIN) 0.5 MG tablet Take 1 tablet (0.5 mg) by mouth 2 times daily as needed for anxiety 10 tablet 0     FLUoxetine (PROZAC) 20 MG capsule Take 3 capsules (60 mg) by mouth in the morning. 270 capsule 3     levonorgestrel (MIRENA) 20 MCG/24HR IUD 1 each by Intrauterine route once       ondansetron (ZOFRAN-ODT) 4 MG ODT tab Take 1 tablet (4 mg) by mouth every 8 hours as needed for nausea 20 tablet 1     Allergies   Allergen Reactions     Latex Hives and Swelling     Azithromycin Hives     Recent Labs   Lab Test 07/12/21  1248 12/18/18  1235 12/18/18  1229   A1C  --   --  5.3   CR 0.79  --   --    POTASSIUM 4.63  --   --    TSH  --  1.13  --         BP Readings from Last 3 Encounters:   04/11/22 126/72   03/07/22 116/78   07/12/21 110/80    Wt Readings from Last 3 Encounters:   04/11/22 74.6 kg (164 lb 6.4 oz)   03/07/22 76.8 kg (169 lb 6.4 oz)   07/12/21 84.4 kg (186 lb)                     Objective    There were no vitals taken for this visit.  Estimated body mass index is 28.22 kg/m  as calculated from the following:    Height as of 4/11/22: 1.626 m (5' 4\").    Weight as of 4/11/22: 74.6 kg (164 lb 6.4 oz).  Physical Exam       GENERAL: Healthy, alert and no distress  EYES: Eyes grossly normal to inspection.  No discharge or erythema, or obvious scleral/conjunctival abnormalities.  RESP: No audible wheeze, cough, or visible cyanosis.  No visible retractions or increased work of breathing.    SKIN: Visible skin clear. No significant rash, abnormal pigmentation or lesions.  NEURO: Cranial nerves grossly intact.  " Mentation and speech appropriate for age.  PSYCH: Mentation appears normal, affect normal/bright, judgement and insight intact, normal speech and appearance well-groomed.      Diagnostic Test Results:  Labs reviewed in Epic          Assessment & Plan     1. Generalized anxiety disorder    2. Aerophobia      Refills sent              RANDY Wade  [facility]        Video-Visit Details    Type of service:  Video Visit (audio/video)    Video End Time (time video stopped): 12:45 PM      Mode of Communication:  Doximity

## 2022-06-30 NOTE — NURSING NOTE
Chief Complaint   Patient presents with     Recheck Medication     Refill Clonazepam, uses for flying and other high anxiety situations, has been doing a lot of traveling lately      Pre-visit Screening:  Immunizations:  up to date  Colonoscopy:  NA  Mammogram: NA  Asthma Action Test/Plan:  NA  PHQ9:  NA  GAD7:  NA  Questioned patient about current smoking habits Pt. has never smoked.  Ok to leave detailed message on voice mail for today's visit only Yes, phone # [phone]

## 2022-09-27 DIAGNOSIS — R11.0 NAUSEA: ICD-10-CM

## 2022-09-27 DIAGNOSIS — F41.8 SITUATIONAL ANXIETY: ICD-10-CM

## 2022-09-28 RX ORDER — ONDANSETRON 4 MG/1
TABLET, ORALLY DISINTEGRATING ORAL
Qty: 20 TABLET | Refills: 1 | Status: SHIPPED | OUTPATIENT
Start: 2022-09-28 | End: 2023-02-03

## 2022-09-28 NOTE — TELEPHONE ENCOUNTER
Please advise, last refilled 03/07/22. You last saw pt on 06/30/22 for anxiety and this says she takes for anxiety.    Anaya White is requesting a refill of:    Pending Prescriptions:                       Disp   Refills    ondansetron (ZOFRAN ODT) 4 MG ODT tab [Ph*20 tab*1            Sig: TAKE 1 TABLET BY MOUTH EVERY 8 HOURS AS NEEDED           FOR NAUSEA  '

## 2022-11-29 ENCOUNTER — MYC REFILL (OUTPATIENT)
Dept: FAMILY MEDICINE | Facility: CLINIC | Age: 22
End: 2022-11-29

## 2022-11-29 DIAGNOSIS — F41.1 GENERALIZED ANXIETY DISORDER: ICD-10-CM

## 2022-11-29 DIAGNOSIS — F40.228 AEROPHOBIA: ICD-10-CM

## 2022-11-29 RX ORDER — CLONAZEPAM 0.5 MG/1
TABLET ORAL
Qty: 3 TABLET | Refills: 0 | Status: SHIPPED | OUTPATIENT
Start: 2022-11-29 | End: 2022-12-06

## 2022-11-29 NOTE — TELEPHONE ENCOUNTER
Tomeka montoya, pt has appt scheduled for 12/06/22.    Anaya White is requesting a refill of:    Pending Prescriptions:                       Disp   Refills    clonazePAM (KLONOPIN) 0.5 MG tablet [Phar*20 tab*0            Sig: TAKE 1 TABLET BY MOUTH 2 TIMES DAILY AS NEEDED           FOR ANXIETY.

## 2022-11-29 NOTE — TELEPHONE ENCOUNTER
Patient is scheduled for 12/6 and is wondering if Mary Ann can send in some   Pending Prescriptions:                       Disp   Refills    clonazePAM (KLONOPIN) 0.5 MG tablet [Phar*20 tab*0            Sig: TAKE 1 TABLET BY MOUTH 2 TIMES DAILY AS NEEDED           FOR ANXIETY.    Routing to [facility] for approval.

## 2022-12-06 ENCOUNTER — OFFICE VISIT (OUTPATIENT)
Dept: FAMILY MEDICINE | Facility: CLINIC | Age: 22
End: 2022-12-06

## 2022-12-06 ENCOUNTER — MYC MEDICAL ADVICE (OUTPATIENT)
Dept: FAMILY MEDICINE | Facility: CLINIC | Age: 22
End: 2022-12-06

## 2022-12-06 VITALS
HEIGHT: 64 IN | SYSTOLIC BLOOD PRESSURE: 120 MMHG | TEMPERATURE: 97.3 F | OXYGEN SATURATION: 98 % | DIASTOLIC BLOOD PRESSURE: 74 MMHG | HEART RATE: 90 BPM | BODY MASS INDEX: 28.68 KG/M2 | WEIGHT: 168 LBS

## 2022-12-06 DIAGNOSIS — F41.1 GENERALIZED ANXIETY DISORDER: Primary | ICD-10-CM

## 2022-12-06 DIAGNOSIS — E53.8 VITAMIN B12 DEFICIENCY (NON ANEMIC): ICD-10-CM

## 2022-12-06 DIAGNOSIS — F40.228 AEROPHOBIA: ICD-10-CM

## 2022-12-06 PROCEDURE — 36415 COLL VENOUS BLD VENIPUNCTURE: CPT | Performed by: PHYSICIAN ASSISTANT

## 2022-12-06 PROCEDURE — 99214 OFFICE O/P EST MOD 30 MIN: CPT | Performed by: PHYSICIAN ASSISTANT

## 2022-12-06 RX ORDER — HYDROXYZINE PAMOATE 25 MG/1
25 CAPSULE ORAL 3 TIMES DAILY PRN
Qty: 20 CAPSULE | Refills: 0 | Status: SHIPPED | OUTPATIENT
Start: 2022-12-06

## 2022-12-06 RX ORDER — CLONAZEPAM 0.5 MG/1
TABLET ORAL
Qty: 10 TABLET | Refills: 0 | Status: SHIPPED | OUTPATIENT
Start: 2022-12-06

## 2022-12-06 ASSESSMENT — ANXIETY QUESTIONNAIRES
3. WORRYING TOO MUCH ABOUT DIFFERENT THINGS: SEVERAL DAYS
6. BECOMING EASILY ANNOYED OR IRRITABLE: NOT AT ALL
GAD7 TOTAL SCORE: 4
5. BEING SO RESTLESS THAT IT IS HARD TO SIT STILL: NOT AT ALL
IF YOU CHECKED OFF ANY PROBLEMS ON THIS QUESTIONNAIRE, HOW DIFFICULT HAVE THESE PROBLEMS MADE IT FOR YOU TO DO YOUR WORK, TAKE CARE OF THINGS AT HOME, OR GET ALONG WITH OTHER PEOPLE: NOT DIFFICULT AT ALL
2. NOT BEING ABLE TO STOP OR CONTROL WORRYING: SEVERAL DAYS
1. FEELING NERVOUS, ANXIOUS, OR ON EDGE: SEVERAL DAYS
7. FEELING AFRAID AS IF SOMETHING AWFUL MIGHT HAPPEN: SEVERAL DAYS
GAD7 TOTAL SCORE: 4

## 2022-12-06 ASSESSMENT — PATIENT HEALTH QUESTIONNAIRE - PHQ9
SUM OF ALL RESPONSES TO PHQ QUESTIONS 1-9: 1
5. POOR APPETITE OR OVEREATING: NOT AT ALL

## 2022-12-06 NOTE — NURSING NOTE
Chief Complaint   Patient presents with     Recheck Medication     Refill meds, non fasting         Pre-visit Screening:  Immunizations:  up to date  Colonoscopy:  NA  Mammogram: NA  Asthma Action Test/Plan:  NA  PHQ9:  Done today  GAD7:  Done today  Questioned patient about current smoking habits Pt. has never smoked.  Ok to leave detailed message on voice mail for today's visit only Yes, phone # [phone]

## 2022-12-06 NOTE — PROGRESS NOTES
Assessment & Plan     Generalized anxiety disorder  Pt will start therapy  Follow-up 1 month for recheck with new PCP  - FLUoxetine (PROZAC) 20 MG capsule  Dispense: 270 capsule; Refill: 3  - clonazePAM (KLONOPIN) 0.5 MG tablet  Dispense: 10 tablet; Refill: 0    Aerophobia  No further refills Klonopin w/o OV  Advised trial of Atarax instead PRN due to less addiction potentional   - clonazePAM (KLONOPIN) 0.5 MG tablet  Dispense: 10 tablet; Refill: 0  - hydrOXYzine (VISTARIL) 25 MG capsule  Dispense: 20 capsule; Refill: 0    Vitamin B12 deficiency (non anemic)  Improved on PO - continue indefinitely  At this time no further workup will be done  - VITAMIN B12 ([facility])  - VENOUS COLLECTION      Follow-up 1 month    I have advised to this patient that I will be leaving [facility] end of the year. I have suggested  he/she est. Care with one of our providers in follow up.           RANDY Wade  [facility]    Subjective     Nursing Notes:   Clau Bose  12/6/2022 10:34 AM  Signed  Chief Complaint   Patient presents with     Recheck Medication     Refill meds, non fasting         Pre-visit Screening:  Immunizations:  up to date  Colonoscopy:  NA  Mammogram: NA  Asthma Action Test/Plan:  NA  PHQ9:  Done today  GAD7:  Done today  Questioned patient about current smoking habits Pt. has never smoked.  Ok to leave detailed message on voice mail for today's visit only Yes, phone # [phone]                  Anaya White is a 22 year old female who presents to clinic today for the following health issues     [facility]     Grad Aug 2023- [employer]      Anxiety Follow-Up    How are you doing with your anxiety since your last visit? Worsened     Are you having other symptoms that might be associated with anxiety? No    Have you had a significant life event? OTHER: school/work     Are you feeling depressed? No    Do you have any concerns with your use of alcohol or other  "drugs? No     Clonazepam-  Panic attack on plane    Will get anxiety around sleep - can't fall asleep get anxiety that she can't sleep  Off screens 1 hour    Read and then tea then bed    Low on B12 - Taking 1000 international unit(s) B12   Wondering why low B12 as she does have normal non-vegan diet.         Social History     Tobacco Use     Smoking status: Never     Smokeless tobacco: Never   Substance Use Topics     Alcohol use: No     Drug use: No     AUDIE-7 SCORE 12/18/2018 4/11/2022   Total Score 0 0     PHQ 12/18/2018 4/11/2022   PHQ-9 Total Score 0 0   Q9: Thoughts of better off dead/self-harm past 2 weeks Not at all Not at all             Current Medications  Current Outpatient Medications   Medication Sig Dispense Refill     clonazePAM (KLONOPIN) 0.5 MG tablet TAKE 1 TABLET BY MOUTH 2 TIMES DAILY AS NEEDED FOR ANXIETY. Strength: 0.5 mg 10 tablet 0     FLUoxetine (PROZAC) 20 MG capsule Take 3 capsules (60 mg) by mouth daily Take 3 capsules (60 mg) by mouth in the evening 270 capsule 3     hydrOXYzine (VISTARIL) 25 MG capsule Take 1 capsule (25 mg) by mouth 3 times daily as needed for anxiety 20 capsule 0     levonorgestrel (MIRENA) 20 MCG/24HR IUD 1 each by Intrauterine route once       ondansetron (ZOFRAN ODT) 4 MG ODT tab TAKE 1 TABLET BY MOUTH EVERY 8 HOURS AS NEEDED FOR NAUSEA 20 tablet 1         Constitutional, HEENT, Cardiovascular, Pulmonary, GI and  systems are negative, except as otherwise noted.          Objective    /74 (BP Location: Left arm, Patient Position: Sitting, Cuff Size: Adult Large)   Pulse 90   Temp 97.3  F (36.3  C) (Temporal)   Ht 1.626 m (5' 4\")   Wt 76.2 kg (168 lb)   SpO2 98%   BMI 28.84 kg/m    Body mass index is 28.84 kg/m .  Physical Exam   GENERAL: healthy, alert and no distress  RESP: lungs clear to auscultation - no rales, rhonchi or wheezes  CV: regular rate and rhythm, normal S1 S2, no S3 or S4, no murmur, click or rub, no peripheral edema and peripheral " pulses strong  MS: no gross musculoskeletal defects noted    Mental Status Exam:   Appearance: calm  Grooming: adequately groomed  Demeanor: engaged, cooperative  Affect: normal  Speech: Normal.  Gait:Normal.  Movements: Normal  Form of thought: Logical, Linear and Goal directed  Thought content:  Normal  Insight:Good   Judgment: Good   Cognition: Good

## 2022-12-07 LAB — VIT B12 SERPL-MCNC: 424 PG/ML (ref 200–1100)

## 2023-02-03 ENCOUNTER — TELEPHONE (OUTPATIENT)
Dept: FAMILY MEDICINE | Facility: CLINIC | Age: 23
End: 2023-02-03

## 2023-02-03 DIAGNOSIS — R11.0 NAUSEA: ICD-10-CM

## 2023-02-03 DIAGNOSIS — F41.8 SITUATIONAL ANXIETY: ICD-10-CM

## 2023-02-03 RX ORDER — ONDANSETRON 4 MG/1
4 TABLET, ORALLY DISINTEGRATING ORAL EVERY 8 HOURS PRN
Qty: 20 TABLET | Refills: 0 | Status: SHIPPED | OUTPATIENT
Start: 2023-02-03

## 2023-04-12 ENCOUNTER — LAB REQUISITION (OUTPATIENT)
Dept: LAB | Facility: CLINIC | Age: 23
End: 2023-04-12

## 2023-04-12 DIAGNOSIS — N64.3 GALACTORRHEA NOT ASSOCIATED WITH CHILDBIRTH: ICD-10-CM

## 2023-04-12 LAB — HOLD SPECIMEN: NORMAL

## 2023-04-12 PROCEDURE — 84443 ASSAY THYROID STIM HORMONE: CPT | Performed by: OBSTETRICS & GYNECOLOGY

## 2023-04-12 PROCEDURE — 80053 COMPREHEN METABOLIC PANEL: CPT | Performed by: OBSTETRICS & GYNECOLOGY

## 2023-04-12 PROCEDURE — 84146 ASSAY OF PROLACTIN: CPT | Performed by: OBSTETRICS & GYNECOLOGY

## 2023-04-13 LAB
ALBUMIN SERPL BCG-MCNC: 4.3 G/DL (ref 3.5–5.2)
ALP SERPL-CCNC: 59 U/L (ref 35–104)
ALT SERPL W P-5'-P-CCNC: 16 U/L (ref 10–35)
ANION GAP SERPL CALCULATED.3IONS-SCNC: 16 MMOL/L (ref 7–15)
AST SERPL W P-5'-P-CCNC: 24 U/L (ref 10–35)
BILIRUB SERPL-MCNC: 0.5 MG/DL
BUN SERPL-MCNC: 12.6 MG/DL (ref 6–20)
CALCIUM SERPL-MCNC: 9.7 MG/DL (ref 8.6–10)
CHLORIDE SERPL-SCNC: 105 MMOL/L (ref 98–107)
CREAT SERPL-MCNC: 0.83 MG/DL (ref 0.51–0.95)
DEPRECATED HCO3 PLAS-SCNC: 17 MMOL/L (ref 22–29)
GFR SERPL CREATININE-BSD FRML MDRD: >90 ML/MIN/1.73M2
GLUCOSE SERPL-MCNC: 85 MG/DL (ref 70–99)
POTASSIUM SERPL-SCNC: 3.9 MMOL/L (ref 3.4–5.3)
PROLACTIN SERPL 3RD IS-MCNC: 27 NG/ML (ref 5–23)
PROT SERPL-MCNC: 7.2 G/DL (ref 6.4–8.3)
SODIUM SERPL-SCNC: 138 MMOL/L (ref 136–145)
TSH SERPL DL<=0.005 MIU/L-ACNC: 2.06 UIU/ML (ref 0.3–4.2)

## 2023-04-14 ENCOUNTER — TRANSFERRED RECORDS (OUTPATIENT)
Dept: HEALTH INFORMATION MANAGEMENT | Facility: CLINIC | Age: 23
End: 2023-04-14

## 2023-05-04 ENCOUNTER — OFFICE VISIT (OUTPATIENT)
Dept: SURGERY | Facility: CLINIC | Age: 23
End: 2023-05-04
Payer: COMMERCIAL

## 2023-05-04 VITALS
HEART RATE: 94 BPM | SYSTOLIC BLOOD PRESSURE: 114 MMHG | WEIGHT: 170 LBS | DIASTOLIC BLOOD PRESSURE: 68 MMHG | HEIGHT: 64 IN | BODY MASS INDEX: 29.02 KG/M2

## 2023-05-04 DIAGNOSIS — N64.52 NIPPLE DISCHARGE: ICD-10-CM

## 2023-05-04 PROCEDURE — 99204 OFFICE O/P NEW MOD 45 MIN: CPT | Performed by: SURGERY

## 2023-05-04 NOTE — LETTER
May 9, 2023          Jess Steele MD  [address]  [place] [address]      RE:   Anaya White 2000      Dear Colleague,    Thank you for referring your patient, Anaya White, to [facility] at [facility]. Please see a copy of my visit note below.    Anaya White is a 23 year old female who presented with a recent history of clear sporadic nipple discharge.  Patient noticed this when she was getting out of the shower and had some straw-colored discharge coming from the right nipple.  There is no associated pain or underlying mass.  Patient has no personal history of breast issues.  She has noticed spotting here and there for the last couple of weeks.  No blood, minimal quantity.  Patient is here to discuss the significance of these findings.     PMH:  Anaya White  has a past medical history of Abnormal gait (5/3/2016), Hip dysplasia, Hip dysplasia, and Pneumonia (2015, 2017).  PSH:  Anaya White  has a past surgical history that includes hip surgery (Left, 2016); other surgery; and hip surgery (Left, 2020).     Home medications and allergies reviewed.     Social History:  Anaya White  reports that she has never smoked. She has never used smokeless tobacco. She reports that she does not drink alcohol and does not use drugs.  Family History:  Anaya White family history includes Alcoholism in her maternal grandfather; Anxiety Disorder in her father, mother, paternal grandmother, and sister; Chronic Bronchitis in her maternal grandmother; Hypertension in her father and maternal grandfather; Lupus in her maternal grandmother; Melanoma in her paternal grandfather; Obesity in her maternal grandfather, maternal grandmother, and mother; Obstructive Sleep Apnea in her maternal grandfather and maternal grandmother; Other - See Comments in her paternal grandmother; Prostate Cancer in her paternal grandfather; Pulmonary Embolism in her  "maternal grandmother; Thyroid Disease in her maternal grandmother.     ROS:  The 10 point Review of Systems is negative other than noted in the HPI.  No fevers or chills.  No breast pain     Physical Exam:  Blood pressure 114/68, pulse 94, height 1.626 m (5' 4\"), weight 77.1 kg (170 lb).  170 lbs 0 oz  Healthy appearing young female in no distress.  Patient has a pleasant affect and communicates well.   Pupils equal round and reactive to light.   No cervical lymphadenopathy or thyromegaly.   Lung fields clear, breathing comfortably.   Heart normal sinus rhythm.  No murmurs rubs or gallops.  Moderately large breast bilaterally.  No underlying breast masses.  Fairly dense breast bilaterally.  No nipple inversion.  No maceration of the nipple on either side.  Unable to express any discharge from the right breast or nipple.  Skin warm, dry.  No obvious rashes or lesions.     All new lab and imaging data was reviewed.       Assessment/plan: Pleasant healthy 23-year-old female with sporadic clear nipple discharge on the right.  This is only been going on for short time and I do not think it is likely to continue.  I was not able to definitively identify the duct associated with this.  If she has persistence, I would likely order some additional imaging which may include ultrasound, mammography, or MRI.  I told her that sporadic clear nipple discharge as not typically associated with early invasive or noninvasive breast cancers.  Surgical co-morbities include anxiety, history of panic attacks.    Again, thank you for allowing me to participate in the care of your patient.      Sincerely,      Perez Bermudez MD        "

## 2023-05-09 PROBLEM — N64.52 NIPPLE DISCHARGE: Status: ACTIVE | Noted: 2023-05-09

## 2023-05-09 NOTE — PROGRESS NOTES
Surgery Consultation, [facility], RANDY Bermudez MD, MD    Anaya White MRN# [id number]   YOB: 2000 Age: 23 year old     PCP:  Mary Ann Gannon (Inactive) [phone]    Chief Complaint: Clear nipple discharge    Pt was seen in consultation from Mary Ann Gannon (Inactive).    History of Present Illness:  Anaya White is a 23 year old female who presented with a recent history of clear sporadic nipple discharge.  Patient noticed this when she was getting out of the shower and had some straw-colored discharge coming from the right nipple.  There is no associated pain or underlying mass.  Patient has no personal history of breast issues.  She has noticed spotting here and there for the last couple of weeks.  No blood, minimal quantity.  Patient is here to discuss the significance of these findings.    PMH:  Anaya White  has a past medical history of Abnormal gait (5/3/2016), Hip dysplasia, Hip dysplasia, and Pneumonia (2015, 2017).  PSH:  Anaya White  has a past surgical history that includes hip surgery (Left, 2016); other surgery; and hip surgery (Left, 2020).    Home medications and allergies reviewed.    Social History:  Anaya White  reports that she has never smoked. She has never used smokeless tobacco. She reports that she does not drink alcohol and does not use drugs.  Family History:  Anaya White family history includes Alcoholism in her maternal grandfather; Anxiety Disorder in her father, mother, paternal grandmother, and sister; Chronic Bronchitis in her maternal grandmother; Hypertension in her father and maternal grandfather; Lupus in her maternal grandmother; Melanoma in her paternal grandfather; Obesity in her maternal grandfather, maternal grandmother, and mother; Obstructive Sleep Apnea in her maternal grandfather and maternal grandmother; Other - See Comments in her paternal grandmother;  "Prostate Cancer in her paternal grandfather; Pulmonary Embolism in her maternal grandmother; Thyroid Disease in her maternal grandmother.    ROS:  The 10 point Review of Systems is negative other than noted in the HPI.  No fevers or chills.  No breast pain    Physical Exam:  Blood pressure 114/68, pulse 94, height 1.626 m (5' 4\"), weight 77.1 kg (170 lb).  170 lbs 0 oz  Healthy appearing young female in no distress.  Patient has a pleasant affect and communicates well.   Pupils equal round and reactive to light.   No cervical lymphadenopathy or thyromegaly.   Lung fields clear, breathing comfortably.   Heart normal sinus rhythm.  No murmurs rubs or gallops.  Moderately large breast bilaterally.  No underlying breast masses.  Fairly dense breast bilaterally.  No nipple inversion.  No maceration of the nipple on either side.  Unable to express any discharge from the right breast or nipple.  Skin warm, dry.  No obvious rashes or lesions.    All new lab and imaging data was reviewed.      Assessment/plan: Pleasant healthy 23-year-old female with sporadic clear nipple discharge on the right.  This is only been going on for short time and I do not think it is likely to continue.  I was not able to definitively identify the duct associated with this.  If she has persistence, I would likely order some additional imaging which may include ultrasound, mammography, or MRI.  I told her that sporadic clear nipple discharge as not typically associated with early invasive or noninvasive breast cancers.  Surgical co-morbities include anxiety, history of panic attacks.    Sae Bermudez M.D.  [facility]  [phone]    Please route or send letter to:  Primary Care Provider (PCP) and Referring Provider  "

## 2023-06-01 ENCOUNTER — HEALTH MAINTENANCE LETTER (OUTPATIENT)
Age: 23
End: 2023-06-01

## 2023-08-03 ENCOUNTER — MYC REFILL (OUTPATIENT)
Dept: FAMILY MEDICINE | Facility: CLINIC | Age: 23
End: 2023-08-03

## 2023-08-03 DIAGNOSIS — F40.228 AEROPHOBIA: ICD-10-CM

## 2023-08-03 DIAGNOSIS — F41.8 SITUATIONAL ANXIETY: ICD-10-CM

## 2023-08-03 DIAGNOSIS — R11.0 NAUSEA: ICD-10-CM

## 2023-08-03 DIAGNOSIS — F41.1 GENERALIZED ANXIETY DISORDER: ICD-10-CM

## 2023-08-03 RX ORDER — ONDANSETRON 4 MG/1
4 TABLET, ORALLY DISINTEGRATING ORAL EVERY 8 HOURS PRN
Qty: 20 TABLET | Refills: 0 | Status: CANCELLED | OUTPATIENT
Start: 2023-08-03

## 2023-08-03 RX ORDER — CLONAZEPAM 0.5 MG/1
TABLET ORAL
Qty: 10 TABLET | Refills: 0 | Status: CANCELLED | OUTPATIENT
Start: 2023-08-03

## 2024-06-02 ENCOUNTER — HEALTH MAINTENANCE LETTER (OUTPATIENT)
Age: 24
End: 2024-06-02

## 2024-06-17 PROBLEM — Z76.89 HEALTH CARE HOME: Status: RESOLVED | Noted: 2018-12-18 | Resolved: 2024-06-17
